# Patient Record
Sex: FEMALE | Race: WHITE | NOT HISPANIC OR LATINO
[De-identification: names, ages, dates, MRNs, and addresses within clinical notes are randomized per-mention and may not be internally consistent; named-entity substitution may affect disease eponyms.]

---

## 2020-08-31 ENCOUNTER — APPOINTMENT (OUTPATIENT)
Dept: TRANSGENDER CARE | Facility: CLINIC | Age: 25
End: 2020-08-31

## 2020-09-02 ENCOUNTER — APPOINTMENT (OUTPATIENT)
Dept: TRANSGENDER CARE | Facility: CLINIC | Age: 25
End: 2020-09-02
Payer: COMMERCIAL

## 2020-09-02 VITALS
DIASTOLIC BLOOD PRESSURE: 86 MMHG | OXYGEN SATURATION: 98 % | RESPIRATION RATE: 14 BRPM | TEMPERATURE: 98 F | SYSTOLIC BLOOD PRESSURE: 130 MMHG | HEART RATE: 82 BPM | BODY MASS INDEX: 19.4 KG/M2 | HEIGHT: 68 IN | WEIGHT: 128 LBS

## 2020-09-02 DIAGNOSIS — Z12.12 ENCOUNTER FOR SCREENING FOR MALIGNANT NEOPLASM OF RECTUM: ICD-10-CM

## 2020-09-02 DIAGNOSIS — Z13.1 ENCOUNTER FOR SCREENING FOR DIABETES MELLITUS: ICD-10-CM

## 2020-09-02 DIAGNOSIS — Z82.49 FAMILY HISTORY OF ISCHEMIC HEART DISEASE AND OTHER DISEASES OF THE CIRCULATORY SYSTEM: ICD-10-CM

## 2020-09-02 DIAGNOSIS — Z87.891 PERSONAL HISTORY OF NICOTINE DEPENDENCE: ICD-10-CM

## 2020-09-02 DIAGNOSIS — F32.9 ANXIETY DISORDER, UNSPECIFIED: ICD-10-CM

## 2020-09-02 DIAGNOSIS — Z76.89 PERSONS ENCOUNTERING HEALTH SERVICES IN OTHER SPECIFIED CIRCUMSTANCES: ICD-10-CM

## 2020-09-02 DIAGNOSIS — Z80.3 FAMILY HISTORY OF MALIGNANT NEOPLASM OF BREAST: ICD-10-CM

## 2020-09-02 DIAGNOSIS — F41.9 ANXIETY DISORDER, UNSPECIFIED: ICD-10-CM

## 2020-09-02 DIAGNOSIS — Z13.220 ENCOUNTER FOR SCREENING FOR LIPOID DISORDERS: ICD-10-CM

## 2020-09-02 PROCEDURE — 36415 COLL VENOUS BLD VENIPUNCTURE: CPT

## 2020-09-02 PROCEDURE — 99205 OFFICE O/P NEW HI 60 MIN: CPT | Mod: 25

## 2020-09-02 NOTE — PHYSICAL EXAM
[No Acute Distress] : no acute distress [Well Nourished] : well nourished [Well-Appearing] : well-appearing [Well Developed] : well developed [Normal Sclera/Conjunctiva] : normal sclera/conjunctiva [EOMI] : extraocular movements intact [No JVD] : no jugular venous distention [Normal Oropharynx] : the oropharynx was normal [No Lymphadenopathy] : no lymphadenopathy [Thyroid Normal, No Nodules] : the thyroid was normal and there were no nodules present [Supple] : supple [No Respiratory Distress] : no respiratory distress  [No Accessory Muscle Use] : no accessory muscle use [Normal Rate] : normal rate  [Clear to Auscultation] : lungs were clear to auscultation bilaterally [Regular Rhythm] : with a regular rhythm [Normal S1, S2] : normal S1 and S2 [No Murmur] : no murmur heard [No Varicosities] : no varicosities [No Edema] : there was no peripheral edema [No Extremity Clubbing/Cyanosis] : no extremity clubbing/cyanosis [Soft] : abdomen soft [Non Tender] : non-tender [Non-distended] : non-distended [Normal Bowel Sounds] : normal bowel sounds [Normal Posterior Cervical Nodes] : no posterior cervical lymphadenopathy [Normal Anterior Cervical Nodes] : no anterior cervical lymphadenopathy [No Joint Swelling] : no joint swelling [Grossly Normal Strength/Tone] : grossly normal strength/tone [No Rash] : no rash [No Focal Deficits] : no focal deficits [Coordination Grossly Intact] : coordination grossly intact [Normal Gait] : normal gait [Normal Insight/Judgement] : insight and judgment were intact [Normal Affect] : the affect was normal

## 2020-09-03 NOTE — SOCIAL HISTORY
[Sexually Active] : The patient is sexually active [Frequently] : frequently [Oral-Receptive (pt. mouth)] : oral-receptive (pt. mouth) [Monogamous] : is not monogamous [To Pt Genitalia] : pt genitalia [Anal-Receptive (pt anus)] : anal-receptive (pt anus) [Partnership for Health – Safe Sex Evidence Based Intervention] : The Partnership for Health Safe Sex Evidence Based Intervention was applied [To Pt Penis] : pt penis [Positive Reinforcement of Safe Behavior Message] : and a positive reinforcement of safe behavior message was provided.

## 2020-09-03 NOTE — PLAN
[FreeTextEntry1] : \par Gender-affirming care:\par Patient to continue care with current mental health provider. Will ask provider for a letter of support to start gender-affirming hormone therapy. \par We discussed hormone therapy at length, effects, timeline, risks, benefits, side effects. \par Patient would like to start in next month or two, so will check baseline endocrine labs and rule out underlying endocrinopathy and assess fitness for initiation of hormone therapy soon. \par Patient is exploring whether they would want to sperm bank or not, but discussed options.\par No desire for name/gender change at present. Not a priority.\par No desire for surgical interventions at present. \par \par HCM:\par Will screen for STIs today. \par Encouraged to maintain balanced diet and to exercise regularly.\par Regular dental and eye exams advised.

## 2020-09-03 NOTE — HISTORY OF PRESENT ILLNESS
[de-identified] : • Name: Murphy \par • AGAB: Male \par • Gender Identity: “Exploring “ \par • Pronouns: They/Them \par • Sexual Orientation: Bisexual \par \par • Reason for Appointment: 25 year old assigned Male at Birth, ID “Exploring “They/Them pronouns interested in hormone affirming therapy and primary care.\par \par COVID Screening: Has not been tested for COVID or antibodies. Denies any COVID related symptoms. Overall feels well. \par \par • Worries: Their parents are . Mother recently sold the house and they will be moving to [facility] in October. Unsure of exact location, as mom is still looking for houses. Is nervous about the cost of medically transitioning. They are unemployed and soon will have to get their own health insurance. \par \par  • Transition HX + Present Life: Their parents  four years ago. At the time, they chose to live with their mother because their place of employment was close by. States as a child they had feminine attractions. Identified as gender fluid most of their life. Interested in medically transitioning to female. Has not come out to their mother. Describes a “tense “relationship but overall she is supportive. Believes their father will be accepting, as they identify as schwab. \par \par • Education | Employment: Graduated high school. Not interested in continuing their education at this time. Unemployed. Former job was as an  in an office. Their daily routine consists of being home and reading. They prefer to keep to themselves. \par \par • Mental Health + Medications: In care with a therapist, Brittany DOMINGO Virtual sessions are weekly. Previously met with a psychiatrist but reports bad experience and never returned.Diagnosed with depression and anxiety. Had Trintellix, Lexapro, Wellbutrin, and Gabapentin, none were helpful. Now on no medications. Not interested in psychiatry care. May consider connecting with support groups in the future, not now. No psych admissions to the hospital. Reports reoccurring SI (thoughts), last happened about one month ago.  \par \par • Endocrinology: interested in hormones. No hx of puberty blockers/ HRT. \par \par • Primary Care: Generally goes to a walk in center if needed. Last physical exam was over a year ago. Wants to establish primary care services at this time. \par \par • Coping Approaches: Practices include reading, playing the guitar and socializing with friends. \par \par • Feelings of Gender Dysphoria/ Body Dysmorphia: Dysphoric about overall appearance. Voiced, significant gender dysphoria relating to facial and body hair. \par \par • Appearance |Tucking | Hair Removal: Presents as male. Starting to lean more towards a gender neutral appearance. Denies ever tucking. Shaves facial and body hair frequently. \par \par • Tattoos/ Piercing: Denies any body/facial piercings. Has half sleeves. All done with single use/sterile needles. May want to complete full sleeve in the future. \par \par • Cigarette, Vaping, Marijuana, Alcohol, and Drug Use: Past cigarette use, 4 years ago. Denies any drug, vaping use. Marijuana use, daily. Denies any alcohol use. \par \par • Reproductive Endocrinology: Not interested.\par  \par • Gender Affirming Surgery: Not a priority right now. \par \par • Name Change + Gender Marker: Not a priority right now. \par \par • General Health: Physical health is good. Mental health not so good. Frequent anxiety symptoms. No recent illness. No known COVID exposure. Sleeping ok. Diet is "not great". Inconsistent. Sometimes does not eat enough. Eats at least one meal/day. No real physical activities currently. \par \par • Sexual Health: Identifies as bisexual. 2 partners in the past one year. <10 partners in lifetime. Oral, receptive anal, insertive anal and vaginal sex. Condom use with most encounters. No HIV postivie partners to his knowledge. Last HIV/ STI exam, June 2019. Never been on PreP. \par \par HCM:\par Last dental exam about 2 years ago.\par Last eye exam within last two years.\par

## 2020-09-03 NOTE — REVIEW OF SYSTEMS
[Joint Pain] : joint pain [Anxiety] : anxiety [Depression] : depression [Fever] : no fever [Chills] : no chills [Fatigue] : no fatigue [Discharge] : no discharge [Redness] : no redness [Vision Problems] : no vision problems [Itching] : no itching [Earache] : no earache [Chest Pain] : no chest pain [Nasal Discharge] : no nasal discharge [Sore Throat] : no sore throat [Shortness Of Breath] : no shortness of breath [Lower Ext Edema] : no lower extremity edema [Palpitations] : no palpitations [Cough] : no cough [Wheezing] : no wheezing [Dyspnea on Exertion] : not dyspnea on exertion [Nausea] : no nausea [Constipation] : no constipation [Diarrhea] : no diarrhea [Heartburn] : no heartburn [Vomiting] : no vomiting [Dysuria] : no dysuria [Melena] : no melena [Frequency] : no frequency [Hematuria] : no hematuria [Muscle Pain] : no muscle pain [Back Pain] : no back pain [Joint Stiffness] : no joint stiffness [Mole Changes] : no mole changes [Skin Rash] : no skin rash [Headache] : no headache [Dizziness] : no dizziness [Fainting] : no fainting [Suicidal] : not suicidal [Insomnia] : no insomnia [Easy Bleeding] : no easy bleeding [Easy Bruising] : no easy bruising [Swollen Glands] : no swollen glands

## 2020-09-16 LAB
25(OH)D3 SERPL-MCNC: 15.3 NG/ML
ALBUMIN SERPL ELPH-MCNC: 5.6 G/DL
ALP BLD-CCNC: 62 U/L
ALT SERPL-CCNC: 24 U/L
ANAL PAP CYTOLOGY: NORMAL
ANION GAP SERPL CALC-SCNC: 17 MMOL/L
APPEARANCE: CLEAR
AST SERPL-CCNC: 22 U/L
BASOPHILS # BLD AUTO: 0.06 K/UL
BASOPHILS NFR BLD AUTO: 0.6 %
BILIRUB SERPL-MCNC: 0.7 MG/DL
BILIRUBIN URINE: NEGATIVE
BLOOD URINE: NEGATIVE
BUN SERPL-MCNC: 16 MG/DL
C TRACH RRNA SPEC QL NAA+PROBE: NOT DETECTED
CALCIUM SERPL-MCNC: 10.5 MG/DL
CHLORIDE SERPL-SCNC: 100 MMOL/L
CHOLEST SERPL-MCNC: 220 MG/DL
CHOLEST/HDLC SERPL: 2.4 RATIO
CO2 SERPL-SCNC: 25 MMOL/L
COLOR: COLORLESS
CREAT SERPL-MCNC: 0.89 MG/DL
EOSINOPHIL # BLD AUTO: 0.04 K/UL
EOSINOPHIL NFR BLD AUTO: 0.4 %
ESTIMATED AVERAGE GLUCOSE: 97 MG/DL
GLUCOSE QUALITATIVE U: NEGATIVE
GLUCOSE SERPL-MCNC: 88 MG/DL
HBA1C MFR BLD HPLC: 5 %
HBV CORE IGG+IGM SER QL: NONREACTIVE
HBV SURFACE AB SERPL IA-ACNC: 92.2 MIU/ML
HBV SURFACE AG SER QL: NONREACTIVE
HCT VFR BLD CALC: 51.4 %
HCV AB SER QL: NONREACTIVE
HCV S/CO RATIO: 0.18 S/CO
HDLC SERPL-MCNC: 91 MG/DL
HGB BLD-MCNC: 17.4 G/DL
HIV1+2 AB SPEC QL IA.RAPID: NONREACTIVE
IMM GRANULOCYTES NFR BLD AUTO: 0.2 %
KETONES URINE: NEGATIVE
LDLC SERPL CALC-MCNC: 113 MG/DL
LEUKOCYTE ESTERASE URINE: NEGATIVE
LYMPHOCYTES # BLD AUTO: 1.54 K/UL
LYMPHOCYTES NFR BLD AUTO: 15.5 %
MAN DIFF?: NORMAL
MCHC RBC-ENTMCNC: 30.3 PG
MCHC RBC-ENTMCNC: 33.9 GM/DL
MCV RBC AUTO: 89.4 FL
MONOCYTES # BLD AUTO: 0.53 K/UL
MONOCYTES NFR BLD AUTO: 5.3 %
N GONORRHOEA RRNA SPEC QL NAA+PROBE: NOT DETECTED
NEUTROPHILS # BLD AUTO: 7.77 K/UL
NEUTROPHILS NFR BLD AUTO: 78 %
NITRITE URINE: NEGATIVE
PH URINE: 6
PLATELET # BLD AUTO: 269 K/UL
POTASSIUM SERPL-SCNC: 4.8 MMOL/L
PROT SERPL-MCNC: 8.5 G/DL
PROTEIN URINE: NEGATIVE
RBC # BLD: 5.75 M/UL
RBC # FLD: 12.3 %
SODIUM SERPL-SCNC: 142 MMOL/L
SOURCE AMPLIFICATION: NORMAL
SOURCE ANAL: NORMAL
SOURCE ORAL: NORMAL
SPECIFIC GRAVITY URINE: 1.01
T PALLIDUM AB SER QL IA: NEGATIVE
TRIGL SERPL-MCNC: 84 MG/DL
TSH SERPL-ACNC: 2.24 UIU/ML
UROBILINOGEN URINE: NORMAL
WBC # FLD AUTO: 9.96 K/UL

## 2020-10-14 ENCOUNTER — APPOINTMENT (OUTPATIENT)
Dept: TRANSGENDER CARE | Facility: CLINIC | Age: 25
End: 2020-10-14
Payer: COMMERCIAL

## 2020-10-14 VITALS
HEART RATE: 72 BPM | TEMPERATURE: 98.5 F | BODY MASS INDEX: 18.94 KG/M2 | RESPIRATION RATE: 16 BRPM | WEIGHT: 125 LBS | DIASTOLIC BLOOD PRESSURE: 68 MMHG | HEIGHT: 68 IN | SYSTOLIC BLOOD PRESSURE: 130 MMHG

## 2020-10-14 PROCEDURE — 99213 OFFICE O/P EST LOW 20 MIN: CPT | Mod: 25

## 2020-10-14 PROCEDURE — 36415 COLL VENOUS BLD VENIPUNCTURE: CPT

## 2020-10-17 NOTE — PLAN
[FreeTextEntry1] : \par Gender:\par Check hormone levels today in anticipation of starting on hormone therapy. \par Patient does not desire full feminization, prefers an androgynous appearance. \par Would likely only want to take one agent. \par Patient needs mental health clearance. Will send once obtained, and can then be scheduled for a follow up for consent and to start on therapy. \par \par Low vitamin D: \par Patient advised to start vitamin D supplementation.

## 2020-10-17 NOTE — REVIEW OF SYSTEMS
[Fever] : no fever [Chills] : no chills [Fatigue] : no fatigue [Discharge] : no discharge [Vision Problems] : no vision problems [Earache] : no earache [Nasal Discharge] : no nasal discharge [Sore Throat] : no sore throat [Chest Pain] : no chest pain [Lower Ext Edema] : no lower extremity edema [Shortness Of Breath] : no shortness of breath [Palpitations] : no palpitations [Cough] : no cough [Wheezing] : no wheezing [Dyspnea on Exertion] : not dyspnea on exertion [Abdominal Pain] : no abdominal pain [Nausea] : no nausea [Constipation] : no constipation [Diarrhea] : no diarrhea [Heartburn] : no heartburn [Vomiting] : no vomiting [Melena] : no melena [Dysuria] : no dysuria [Frequency] : no frequency [Joint Pain] : no joint pain [Itching] : no itching [Back Pain] : no back pain [Dizziness] : no dizziness [Headache] : no headache [Mole Changes] : no mole changes [Suicidal] : not suicidal [FreeTextEntry9] : left hand pain below wrist 3rd and 4th digits

## 2020-10-17 NOTE — HISTORY OF PRESENT ILLNESS
[FreeTextEntry1] : follow up gender-affirming care [de-identified] : 25 year old gender nonconforming individual here today to discuss start of hormone therapy further. They have been thinking about things more. Still don't think that they want to preserve sperm. Has not yet gotten a letter of clearance from their mental health provider, but reports that provider is willing to generate one. \par \par Patient also complaining of left hand pain. Happens about once per week. Usually short, but last night was for a long period (about 2.5 hours). No numbness or tingling. Hand felt weak as well. No cervical spine pain.

## 2020-10-17 NOTE — PHYSICAL EXAM
[No Acute Distress] : no acute distress [Well Nourished] : well nourished [Well Developed] : well developed [Well-Appearing] : well-appearing [Normal Sclera/Conjunctiva] : normal sclera/conjunctiva [EOMI] : extraocular movements intact [Normal Oropharynx] : the oropharynx was normal [Normal Outer Ear/Nose] : the outer ears and nose were normal in appearance [Supple] : supple [No Lymphadenopathy] : no lymphadenopathy [No Accessory Muscle Use] : no accessory muscle use [No Respiratory Distress] : no respiratory distress  [Normal Rate] : normal rate  [Clear to Auscultation] : lungs were clear to auscultation bilaterally [Regular Rhythm] : with a regular rhythm [Normal S1, S2] : normal S1 and S2 [No Varicosities] : no varicosities [No Edema] : there was no peripheral edema [No Murmur] : no murmur heard [No Extremity Clubbing/Cyanosis] : no extremity clubbing/cyanosis [Non Tender] : non-tender [Soft] : abdomen soft [Normal Posterior Cervical Nodes] : no posterior cervical lymphadenopathy [Normal Bowel Sounds] : normal bowel sounds [Non-distended] : non-distended [Normal Anterior Cervical Nodes] : no anterior cervical lymphadenopathy [No Joint Swelling] : no joint swelling [No Rash] : no rash [Grossly Normal Strength/Tone] : grossly normal strength/tone [Coordination Grossly Intact] : coordination grossly intact [Normal Gait] : normal gait [No Focal Deficits] : no focal deficits [Normal Insight/Judgement] : insight and judgment were intact [Normal Affect] : the affect was normal

## 2020-10-21 DIAGNOSIS — Z86.2 PERSONAL HISTORY OF DISEASES OF THE BLOOD AND BLOOD-FORMING ORGANS AND CERTAIN DISORDERS INVOLVING THE IMMUNE MECHANISM: ICD-10-CM

## 2020-10-21 LAB
BASOPHILS # BLD AUTO: 0.03 K/UL
BASOPHILS NFR BLD AUTO: 0.6 %
EOSINOPHIL # BLD AUTO: 0.08 K/UL
EOSINOPHIL NFR BLD AUTO: 1.7 %
HCT VFR BLD CALC: 44.2 %
HGB BLD-MCNC: 14.8 G/DL
IMM GRANULOCYTES NFR BLD AUTO: 0.2 %
LYMPHOCYTES # BLD AUTO: 1.83 K/UL
LYMPHOCYTES NFR BLD AUTO: 39.5 %
MAN DIFF?: NORMAL
MCHC RBC-ENTMCNC: 29.3 PG
MCHC RBC-ENTMCNC: 33.5 GM/DL
MCV RBC AUTO: 87.5 FL
MONOCYTES # BLD AUTO: 0.41 K/UL
MONOCYTES NFR BLD AUTO: 8.9 %
NEUTROPHILS # BLD AUTO: 2.27 K/UL
NEUTROPHILS NFR BLD AUTO: 49.1 %
PLATELET # BLD AUTO: 238 K/UL
RBC # BLD: 5.05 M/UL
RBC # FLD: 11.9 %
WBC # FLD AUTO: 4.63 K/UL

## 2020-10-26 LAB
ALBUMIN SERPL ELPH-MCNC: 4.8 G/DL
ALP BLD-CCNC: 57 U/L
ALT SERPL-CCNC: 18 U/L
ANION GAP SERPL CALC-SCNC: 10 MMOL/L
AST SERPL-CCNC: 22 U/L
BILIRUB SERPL-MCNC: 1 MG/DL
BUN SERPL-MCNC: 17 MG/DL
CALCIUM SERPL-MCNC: 9.7 MG/DL
CHLORIDE SERPL-SCNC: 104 MMOL/L
CO2 SERPL-SCNC: 27 MMOL/L
CREAT SERPL-MCNC: 0.99 MG/DL
ESTRADIOL SERPL-MCNC: 26 PG/ML
FSH SERPL-MCNC: 3.7 IU/L
GLUCOSE SERPL-MCNC: 85 MG/DL
LH SERPL-ACNC: 5.1 IU/L
POTASSIUM SERPL-SCNC: 4.8 MMOL/L
PROLACTIN SERPL-MCNC: 12.7 NG/ML
PROT SERPL-MCNC: 7.4 G/DL
SHBG SERPL-SCNC: 42 NMOL/L
SODIUM SERPL-SCNC: 141 MMOL/L
TESTOST SERPL-MCNC: 554 NG/DL

## 2020-10-29 ENCOUNTER — TRANSCRIPTION ENCOUNTER (OUTPATIENT)
Age: 25
End: 2020-10-29

## 2020-11-04 LAB
MONOMERIC PROLACTIN (ICMA)*: 13 NG/ML
PERCENT MACROPROLACTIN: 19 %
PROLACTIN, SERUM (ICMA)*: 16 NG/ML

## 2020-11-23 ENCOUNTER — NON-APPOINTMENT (OUTPATIENT)
Age: 25
End: 2020-11-23

## 2020-12-02 ENCOUNTER — APPOINTMENT (OUTPATIENT)
Dept: TRANSGENDER CARE | Facility: CLINIC | Age: 25
End: 2020-12-02
Payer: COMMERCIAL

## 2020-12-02 PROCEDURE — 99214 OFFICE O/P EST MOD 30 MIN: CPT | Mod: 95

## 2020-12-02 NOTE — ASSESSMENT
[FreeTextEntry1] : Patient wishes to start gender-affirming Feminization therapy.  \par \par Discussed with patient that the feminizing changes of Estrogen and Spironolactone may take several months to become noticeable. We will be starting with low doses per patient preference, so changes may be very subtle or non-existent. \par \par Discussed permanent changes which include breast growth, testicular atrophy, and infertility. \par \par Discussed non-permanent changes including loss of muscle mass and strength, weight gain w/ redistribution of fat to buttocks and thighs, facial/body hair softening and lightening (current facial and body hair will not go away), male pattern baldness may slow or stop, but not go away, reduced sex drive, decreased strength of erections or cessation of erections, decrease in volume and viscosity of ejaculate, changes in mood including; increased emotional responses.  \par \par Discussed risks and possible side effects, including loss of fertility, increased urinary frequency, possible increased risk of kidney damage, increase risk of blood clots, strokes, and heart attacks, possible increase in cholesterol and blood pressure leading to increased risk for cardiovascular disease, possible increase in risk of developing diabetes, increased appetite and weight gain, possible liver damage, possible worsening of headaches or migraines, galactorrhea and increased prolactin levels, increased risk of prolactinomas, possible worsening of depression or mood swings, and possible increased risk of breast cancer. \par \par Patient understands that smoking cigarettes may increase some of the risks of taking estrogen, and that taking doses higher than recommended will increase the risks. Patient understands that feminizing hormone therapy is expected to be lifelong and that suddenly stopping it after a long time may have negative health effects. Patient understands they may choose to stop therapy at any time and for any reason, but they are encouraged to discuss this decision with a healthcare provider prior to doing this. Patient understands that some medical reasons and/or safety concerns may require decrease in dosage or cessation of therapy at providers discretion.  \par \par Cryopreservation/fertility options were discussed with patient. Not interested. \par \par Patient voices understanding of our discussion today. Questions and concerns have been addressed. Patient consents to initiation of treatment.  \par \par \par

## 2020-12-02 NOTE — HISTORY OF PRESENT ILLNESS
[Other Location: e.g. School (Enter Location, City,State)___] : at [unfilled], at the time of the visit. [Medical Office: ([facility])___] : at the medical office located in  [Verbal consent obtained from patient] : the patient, [unfilled] [FreeTextEntry1] : Starting feminizing hormone therapy [de-identified] : Visit today to discuss initiation of feminizing hormone therapy. They report over the past few days they have had some nausea, heartburn, night sweats, but feeling better today. Feeling better today, so they have started to eat again. Patient still with some heartburn. Would like to try some medication for this. \par \par Patient was to cleared to start on hormone therapy by psychologist Tita Zepeda.\par

## 2020-12-02 NOTE — REVIEW OF SYSTEMS
[Fatigue] : fatigue [Nausea] : nausea [Diarrhea] : diarrhea [Heartburn] : heartburn [Insomnia] : insomnia [Anxiety] : anxiety [Fever] : no fever [Chills] : no chills [Discharge] : no discharge [Pain] : no pain [Earache] : no earache [Nasal Discharge] : no nasal discharge [Sore Throat] : no sore throat [Chest Pain] : no chest pain [Palpitations] : no palpitations [Lower Ext Edema] : no lower extremity edema [Shortness Of Breath] : no shortness of breath [Wheezing] : no wheezing [Cough] : no cough [Vomiting] : no vomiting [Dysuria] : no dysuria [Hematuria] : no hematuria [Joint Pain] : no joint pain [Back Pain] : no back pain [Suicidal] : not suicidal

## 2020-12-02 NOTE — PLAN
[FreeTextEntry1] : Baseline labs reviewed.  \par \par Start Estradiol 1 mg daily and Spironolactone 25mg daily as patient does not want maximal feminizing effect. Will monitor for response.   \par \par Repeat levels in 3 months, and titrate based on effects. \par \par

## 2020-12-02 NOTE — PHYSICAL EXAM
[No Acute Distress] : no acute distress [Well Nourished] : well nourished [Well Developed] : well developed [Well-Appearing] : well-appearing [EOMI] : extraocular movements intact [No Respiratory Distress] : no respiratory distress  [No Accessory Muscle Use] : no accessory muscle use [No Focal Deficits] : no focal deficits [Normal Affect] : the affect was normal [Normal Insight/Judgement] : insight and judgment were intact

## 2020-12-03 ENCOUNTER — NON-APPOINTMENT (OUTPATIENT)
Age: 25
End: 2020-12-03

## 2021-02-16 ENCOUNTER — NON-APPOINTMENT (OUTPATIENT)
Age: 26
End: 2021-02-16

## 2021-02-21 ENCOUNTER — RX RENEWAL (OUTPATIENT)
Age: 26
End: 2021-02-21

## 2021-02-22 ENCOUNTER — APPOINTMENT (OUTPATIENT)
Dept: TRANSGENDER CARE | Facility: CLINIC | Age: 26
End: 2021-02-22
Payer: COMMERCIAL

## 2021-02-22 VITALS
DIASTOLIC BLOOD PRESSURE: 70 MMHG | BODY MASS INDEX: 18.04 KG/M2 | HEART RATE: 77 BPM | SYSTOLIC BLOOD PRESSURE: 120 MMHG | WEIGHT: 119 LBS | HEIGHT: 68 IN | TEMPERATURE: 98.1 F | RESPIRATION RATE: 16 BRPM | OXYGEN SATURATION: 99 %

## 2021-02-22 PROCEDURE — 99213 OFFICE O/P EST LOW 20 MIN: CPT

## 2021-02-22 PROCEDURE — 99072 ADDL SUPL MATRL&STAF TM PHE: CPT

## 2021-02-22 NOTE — PHYSICAL EXAM
[No Acute Distress] : no acute distress [Well Nourished] : well nourished [Well Developed] : well developed [Well-Appearing] : well-appearing [Normal Sclera/Conjunctiva] : normal sclera/conjunctiva [EOMI] : extraocular movements intact [No Lymphadenopathy] : no lymphadenopathy [Supple] : supple [No Respiratory Distress] : no respiratory distress  [No Accessory Muscle Use] : no accessory muscle use [Clear to Auscultation] : lungs were clear to auscultation bilaterally [Normal Rate] : normal rate  [Regular Rhythm] : with a regular rhythm [Normal S1, S2] : normal S1 and S2 [Normal Affect] : the affect was normal [Normal Insight/Judgement] : insight and judgment were intact [de-identified] : +small bite marks on lateral borders of tongue, no petechiae and no plaques noted

## 2021-02-22 NOTE — PLAN
[FreeTextEntry1] : \par Tongue pain:\par Do not believe that patient's tongue discomfort is due to an allergic reaction to Estradiol/Spironolactone. More likely part of a viral syndrome he describes having when symptoms first started. Will give Magic Mouthwash for now and advised patient ok to restart hormone regimen. If symptoms recur, will need to work up further with possible referral for allergy testing. Discussed warning signs for an allergic reaction/angioedema including tongue/lip swelling, difficulty breathing/stridor, chest tightness, all of which will require urgent medical attention.

## 2021-02-22 NOTE — REVIEW OF SYSTEMS
[Fever] : no fever [Chills] : no chills [Fatigue] : no fatigue [Pain] : no pain [Discharge] : no discharge [Vision Problems] : no vision problems [Earache] : no earache [Nasal Discharge] : no nasal discharge [Sore Throat] : no sore throat [Chest Pain] : no chest pain [Palpitations] : no palpitations [Shortness Of Breath] : no shortness of breath [Wheezing] : no wheezing [Cough] : no cough [Abdominal Pain] : no abdominal pain [Nausea] : no nausea [Vomiting] : no vomiting [FreeTextEntry4] : +tongue soreness

## 2021-02-22 NOTE — HISTORY OF PRESENT ILLNESS
[FreeTextEntry1] : oral lesions, ?adverse reaction [de-identified] : Patient here today complaining of a few weeks of painful white plaques on their tongue as well as some tongue inflammation. They report this started about 2 months after starting on hormone therapy, so they stopped it about 1 week ago. Had a cold back a couple of months ago near when this started. No fevers or chills recently. No recent sexual activity. None since before their last testing in September.\par

## 2021-02-25 ENCOUNTER — NON-APPOINTMENT (OUTPATIENT)
Age: 26
End: 2021-02-25

## 2021-03-04 ENCOUNTER — APPOINTMENT (OUTPATIENT)
Dept: TRANSGENDER CARE | Facility: CLINIC | Age: 26
End: 2021-03-04

## 2021-03-18 ENCOUNTER — APPOINTMENT (OUTPATIENT)
Dept: TRANSGENDER CARE | Facility: CLINIC | Age: 26
End: 2021-03-18
Payer: COMMERCIAL

## 2021-03-18 VITALS
RESPIRATION RATE: 16 BRPM | OXYGEN SATURATION: 98 % | BODY MASS INDEX: 19.1 KG/M2 | WEIGHT: 126 LBS | TEMPERATURE: 98.4 F | DIASTOLIC BLOOD PRESSURE: 70 MMHG | SYSTOLIC BLOOD PRESSURE: 110 MMHG | HEIGHT: 68 IN | HEART RATE: 84 BPM

## 2021-03-18 DIAGNOSIS — E55.9 VITAMIN D DEFICIENCY, UNSPECIFIED: ICD-10-CM

## 2021-03-18 DIAGNOSIS — K14.6 GLOSSODYNIA: ICD-10-CM

## 2021-03-18 LAB
ALBUMIN SERPL ELPH-MCNC: 4.8 G/DL
ALP BLD-CCNC: 66 U/L
ALT SERPL-CCNC: 16 U/L
ANION GAP SERPL CALC-SCNC: 9 MMOL/L
AST SERPL-CCNC: 20 U/L
BILIRUB SERPL-MCNC: 0.4 MG/DL
BUN SERPL-MCNC: 14 MG/DL
CALCIUM SERPL-MCNC: 9.8 MG/DL
CHLORIDE SERPL-SCNC: 103 MMOL/L
CO2 SERPL-SCNC: 30 MMOL/L
CREAT SERPL-MCNC: 0.91 MG/DL
ESTRADIOL SERPL-MCNC: 46 PG/ML
GLUCOSE SERPL-MCNC: 86 MG/DL
POTASSIUM SERPL-SCNC: 5.2 MMOL/L
PROT SERPL-MCNC: 7.2 G/DL
SODIUM SERPL-SCNC: 142 MMOL/L
TESTOST SERPL-MCNC: 524 NG/DL
TSH SERPL-ACNC: 2.07 UIU/ML

## 2021-03-18 PROCEDURE — 99072 ADDL SUPL MATRL&STAF TM PHE: CPT

## 2021-03-18 PROCEDURE — 99214 OFFICE O/P EST MOD 30 MIN: CPT

## 2021-03-18 NOTE — PHYSICAL EXAM
[No Acute Distress] : no acute distress [Well Nourished] : well nourished [Well Developed] : well developed [Well-Appearing] : well-appearing [Normal Sclera/Conjunctiva] : normal sclera/conjunctiva [EOMI] : extraocular movements intact [Normal Outer Ear/Nose] : the outer ears and nose were normal in appearance [No Lymphadenopathy] : no lymphadenopathy [Supple] : supple [No Respiratory Distress] : no respiratory distress  [No Accessory Muscle Use] : no accessory muscle use [Clear to Auscultation] : lungs were clear to auscultation bilaterally [Normal Rate] : normal rate  [Regular Rhythm] : with a regular rhythm [Normal S1, S2] : normal S1 and S2 [No Edema] : there was no peripheral edema [No Extremity Clubbing/Cyanosis] : no extremity clubbing/cyanosis [Soft] : abdomen soft [Non Tender] : non-tender [Non-distended] : non-distended [Normal Bowel Sounds] : normal bowel sounds [No Joint Swelling] : no joint swelling [Grossly Normal Strength/Tone] : grossly normal strength/tone [Coordination Grossly Intact] : coordination grossly intact [No Focal Deficits] : no focal deficits [Normal Gait] : normal gait [Normal Affect] : the affect was normal [Normal Insight/Judgement] : insight and judgment were intact [de-identified] : +small linear bite marks on lateral borders of tongue

## 2021-03-18 NOTE — PLAN
[FreeTextEntry1] : \par GAHT:\par Increase Estradiol to 1mg BID and Spironolactone 50mg BID.\par Repeat labs in 3 months.\par \par Vitamin D deficiency: \par Repeat level today.\par \par Tongue discomfort:\par Improved with magic mouthwash.\par Will try OTC Mouth Sore Mouthwash (with peroxide) daily for now. and monitor symptoms. \par

## 2021-03-18 NOTE — REVIEW OF SYSTEMS
[Fever] : no fever [Chills] : no chills [Fatigue] : no fatigue [Discharge] : no discharge [Vision Problems] : no vision problems [Earache] : no earache [Nasal Discharge] : no nasal discharge [Sore Throat] : no sore throat [Chest Pain] : no chest pain [Palpitations] : no palpitations [Lower Ext Edema] : no lower extremity edema [Shortness Of Breath] : no shortness of breath [Wheezing] : no wheezing [Cough] : no cough [Abdominal Pain] : no abdominal pain [Nausea] : no nausea [Constipation] : no constipation [Diarrhea] : no diarrhea [Vomiting] : no vomiting [Dysuria] : no dysuria [Hematuria] : no hematuria [Joint Pain] : no joint pain [Back Pain] : no back pain [Headache] : no headache [Dizziness] : no dizziness [FreeTextEntry4] : +tongue discomfort

## 2021-03-18 NOTE — HISTORY OF PRESENT ILLNESS
[FreeTextEntry1] : f/u hormone therapy [de-identified] : Patient has been feeling ok. They have been back on Estradiol 1 mg daily and Spironolactone 25mg daily. Labs ordered form 3/8 (drawn on 3/12 per patient) show Estradiol at 46 and Testosterone at 524. Mouth sores have improved with magic mouthwash. Still mild discomfort. No recent illness.

## 2021-03-22 ENCOUNTER — TRANSCRIPTION ENCOUNTER (OUTPATIENT)
Age: 26
End: 2021-03-22

## 2021-03-22 LAB — 25(OH)D3 SERPL-MCNC: 35.2 NG/ML

## 2021-04-19 ENCOUNTER — APPOINTMENT (OUTPATIENT)
Dept: TRANSGENDER CARE | Facility: CLINIC | Age: 26
End: 2021-04-19
Payer: COMMERCIAL

## 2021-04-19 VITALS
BODY MASS INDEX: 19.1 KG/M2 | RESPIRATION RATE: 16 BRPM | HEART RATE: 74 BPM | DIASTOLIC BLOOD PRESSURE: 72 MMHG | WEIGHT: 126 LBS | SYSTOLIC BLOOD PRESSURE: 118 MMHG | HEIGHT: 68 IN | TEMPERATURE: 98.3 F | OXYGEN SATURATION: 99 %

## 2021-04-19 DIAGNOSIS — R11.0 NAUSEA: ICD-10-CM

## 2021-04-19 PROCEDURE — 99213 OFFICE O/P EST LOW 20 MIN: CPT

## 2021-04-19 PROCEDURE — 99072 ADDL SUPL MATRL&STAF TM PHE: CPT

## 2021-04-19 NOTE — HISTORY OF PRESENT ILLNESS
[FreeTextEntry8] : JOSSELIN PANTOJA is a 25 year old, seen on 04/19/2021 for  LLQ pain accompanied by nausea x1 week. \par  As per patient, he is currently in the process of been moving homes and is unsure when exactly this pain began but noticed the pain while moving.\par Patient is unsure of what cause the pain and reports that it was consistently a 6/10 at the time (1 week ago) and last for 5 days but has since "receded" and comes and goes.  Currently states 2/10 pain.\par Describes pain as dull, heaviness with moments of intense sharp pain to lower left quadrant of abdomen, without radiation.\par Denies fevers, headaches, back pain.\par Denies diarrhea, vomiting, loss of appetite, or constipation. \par Patient states that he is unsure if it is related to hernia but thought it could be a possibility though he "didn't’ feel anything when he pressed in area."\par Denies any OTC remedies or doing anything specific other than resting to alleviate the pain.\par Currently eating and drinking with out issue.\par At present patient denies any other complaints.  \par \par

## 2021-04-19 NOTE — PHYSICAL EXAM
[No Acute Distress] : no acute distress [Well Nourished] : well nourished [Well Developed] : well developed [Well-Appearing] : well-appearing [Normal Voice/Communication] : normal voice/communication [Normal Sclera/Conjunctiva] : normal sclera/conjunctiva [PERRL] : pupils equal round and reactive to light [Normal Outer Ear/Nose] : the outer ears and nose were normal in appearance [No JVD] : no jugular venous distention [No Lymphadenopathy] : no lymphadenopathy [No Respiratory Distress] : no respiratory distress  [No Accessory Muscle Use] : no accessory muscle use [Clear to Auscultation] : lungs were clear to auscultation bilaterally [Normal Rate] : normal rate  [Regular Rhythm] : with a regular rhythm [Normal S1, S2] : normal S1 and S2 [No Murmur] : no murmur heard [No Abdominal Bruit] : a ~M bruit was not heard ~T in the abdomen [No Edema] : there was no peripheral edema [No Palpable Aorta] : no palpable aorta [Soft] : abdomen soft [Non-distended] : non-distended [No Masses] : no abdominal mass palpated [Normal Bowel Sounds] : normal bowel sounds [Flat] : flat [Normal] : normal to percussion [Suprapubic] : in the suprapubic area [LLQ] : in the left lower quadrant [No Mass] : no masses were palpated [No HSM] : no hepatosplenomegaly noted [None] : no CVA tenderness [No CVA Tenderness] : no CVA  tenderness [No Spinal Tenderness] : no spinal tenderness [Coordination Grossly Intact] : coordination grossly intact [Normal Gait] : normal gait [Deep Tendon Reflexes (DTR)] : deep tendon reflexes were 2+ and symmetric [Speech Grossly Normal] : speech grossly normal [Normal Affect] : the affect was normal [Alert and Oriented x3] : oriented to person, place, and time [Normal Mood] : the mood was normal [Normal Insight/Judgement] : insight and judgment were intact [No Rash] : no rash [No Skin Lesions] : no skin lesions [Epigastric] : not in the epigastric area [Periumbilical] : not periumbilical [RUQ] : not in the RUQ [RLQ] : not in the RLQ [LUQ] : not in the LUQ [Firm] : not firm [Rigid] : not rigid [Rebound] : no rebound [Guarding] : no guarding [Morelos's] : a negative Morelos's sign [Rovsing's] : a negative Rovsing's sign [Obturator] : a negative obturator sign [Psoas] : a negative psoas sign [Liver Tender To Palpation] : not tender [Acne] : no acne

## 2021-04-19 NOTE — PLAN
[FreeTextEntry1] : LLQ pain and nausea\par  - UA/UC sent at this visit\par  - Patient instructed to eat bland foods and increase fluid intake until nausea subsides\par  - Patient instructed to rest and avoid heavy lifting\par  - PRN Zofran ODT provided for relief of nausea\par  - Prescription for US of abdomen and scrotal area provided to r/o any underlying pathology\par  - Instructed to seek out immediate, local care for sudden onset of worsening symptoms\par  - Patient to follow up as needed or sooner if pain persists

## 2021-04-19 NOTE — ASSESSMENT
[FreeTextEntry1] : Patient presenting with c/o LLQ pain x 1.5 weeks.  Pain was initially 6/10 at onset and is currently 1-2/10 on pain scale.  Denies fevers, chills, radiation of pain to back or lower extremities.  Denies changes to diet or changes to bowel habits.  Denies any pain or difficulty with urination. Patient reports associated, persistent nausea but denies vomiting.  Patient states that they have not taken any new medications or tried any OTC medications for relief. Reports only change in daily routine is a recent move from one home to another.\par \par Upon examination, abdomen not tender to palp and (+) bowel sounds in all four quadrants.  No palpable masses noted.  Denies rebound tenderness at present.

## 2021-04-22 LAB
APPEARANCE: CLEAR
APPEARANCE: CLEAR
BACTERIA: NEGATIVE
BILIRUBIN URINE: NEGATIVE
BILIRUBIN URINE: NEGATIVE
BLOOD URINE: NEGATIVE
BLOOD URINE: NEGATIVE
COLOR: NORMAL
COLOR: NORMAL
GLUCOSE QUALITATIVE U: NEGATIVE
GLUCOSE QUALITATIVE U: NEGATIVE
HYALINE CASTS: 0 /LPF
KETONES URINE: NEGATIVE
KETONES URINE: NEGATIVE
LEUKOCYTE ESTERASE URINE: NEGATIVE
LEUKOCYTE ESTERASE URINE: NEGATIVE
MICROSCOPIC-UA: NORMAL
NITRITE URINE: NEGATIVE
NITRITE URINE: NEGATIVE
PH URINE: 6
PH URINE: 6
PROTEIN URINE: NEGATIVE
PROTEIN URINE: NEGATIVE
RED BLOOD CELLS URINE: 2 /HPF
SPECIFIC GRAVITY URINE: 1.02
SPECIFIC GRAVITY URINE: 1.02
SQUAMOUS EPITHELIAL CELLS: 0 /HPF
UROBILINOGEN URINE: NORMAL
UROBILINOGEN URINE: NORMAL
WHITE BLOOD CELLS URINE: 0 /HPF

## 2021-04-27 ENCOUNTER — NON-APPOINTMENT (OUTPATIENT)
Age: 26
End: 2021-04-27

## 2021-04-27 DIAGNOSIS — R10.32 LEFT LOWER QUADRANT PAIN: ICD-10-CM

## 2021-04-27 DIAGNOSIS — R10.9 UNSPECIFIED ABDOMINAL PAIN: ICD-10-CM

## 2021-06-03 ENCOUNTER — APPOINTMENT (OUTPATIENT)
Dept: TRANSGENDER CARE | Facility: CLINIC | Age: 26
End: 2021-06-03
Payer: COMMERCIAL

## 2021-06-03 VITALS
TEMPERATURE: 98.6 F | HEART RATE: 89 BPM | OXYGEN SATURATION: 99 % | WEIGHT: 126 LBS | SYSTOLIC BLOOD PRESSURE: 130 MMHG | DIASTOLIC BLOOD PRESSURE: 84 MMHG | HEIGHT: 68 IN | BODY MASS INDEX: 19.1 KG/M2

## 2021-06-03 DIAGNOSIS — Z13.29 ENCOUNTER FOR SCREENING FOR OTHER SUSPECTED ENDOCRINE DISORDER: ICD-10-CM

## 2021-06-03 PROCEDURE — 99072 ADDL SUPL MATRL&STAF TM PHE: CPT

## 2021-06-03 PROCEDURE — 99213 OFFICE O/P EST LOW 20 MIN: CPT | Mod: 25

## 2021-06-03 PROCEDURE — 36415 COLL VENOUS BLD VENIPUNCTURE: CPT

## 2021-06-03 NOTE — PLAN
[FreeTextEntry1] : HCM:\par Discussed the possible option of an abdominal CAT scan if discomfort to LLQ persists.\par Reiterated the importance of following up with GI for related abdominal discomfort\par All questions and concerns addressed at this time\par \par HRT:\par Lab work done today to assess current levels and fitness for increasing current regimen\par Patient to continue on HRT as prescribed unless otherwise advised by provider\par To follow up in 3 months or sooner as needed.

## 2021-06-03 NOTE — HISTORY OF PRESENT ILLNESS
[FreeTextEntry1] : patient in for labwork  [de-identified] : JOSSELIN PANTOJA is a 25 year old, genderqueer patient seen on 06/03/2021 for  lab work to evaluate HRT regimen and assess levels.\par Current regimen is 1mg tablets of Estradiol BID and has noticed a few changes that they are happy with.  They are hoping to add to facial structure changes and heighten their torso changes (waist, hips, etc).\par \par As per patient, they were last seen and evaluated for LLQ discomfort but all scans showed normal results.  States that the pain comes and goes and was last present starting 1 week ago and comes and goes.  Worst moments of intensity are 6/10 but typically resides at 2/10.  Also reports testicular pain was present over the weekend but is not currently present.  Denies any penile discharge, decreased in urine output or dysuria.  \par Currently states that they feel well overall and at present.\par No signs/symptoms of acute illness. No fever, myalgias, throat pain, meningismus.\par  Patient denies any known exposure or signs/symptoms of COVID-19 at this time. \par Recieved both doses of COVID-19 vaccine (Moderna).\par

## 2021-06-03 NOTE — PHYSICAL EXAM
[No Acute Distress] : no acute distress [Well Nourished] : well nourished [Well Developed] : well developed [Well-Appearing] : well-appearing [Normal Voice/Communication] : normal voice/communication [Normal Outer Ear/Nose] : the outer ears and nose were normal in appearance [No JVD] : no jugular venous distention [No Respiratory Distress] : no respiratory distress  [No Accessory Muscle Use] : no accessory muscle use [Clear to Auscultation] : lungs were clear to auscultation bilaterally [Normal Rate] : normal rate  [Regular Rhythm] : with a regular rhythm [No Edema] : there was no peripheral edema [Soft] : abdomen soft [Non-distended] : non-distended [No HSM] : no HSM [Normal Bowel Sounds] : normal bowel sounds [Coordination Grossly Intact] : coordination grossly intact [Normal Gait] : normal gait [Speech Grossly Normal] : speech grossly normal [Memory Grossly Normal] : memory grossly normal [Normal Affect] : the affect was normal [Alert and Oriented x3] : oriented to person, place, and time [Normal Mood] : the mood was normal [Normal Insight/Judgement] : insight and judgment were intact

## 2021-06-10 LAB
ALBUMIN SERPL ELPH-MCNC: 5 G/DL
ALP BLD-CCNC: 56 U/L
ALT SERPL-CCNC: 15 U/L
ANION GAP SERPL CALC-SCNC: 15 MMOL/L
AST SERPL-CCNC: 21 U/L
BASOPHILS # BLD AUTO: 0.02 K/UL
BASOPHILS NFR BLD AUTO: 0.3 %
BILIRUB SERPL-MCNC: 0.6 MG/DL
BUN SERPL-MCNC: 17 MG/DL
CALCIUM SERPL-MCNC: 9.5 MG/DL
CHLORIDE SERPL-SCNC: 98 MMOL/L
CO2 SERPL-SCNC: 24 MMOL/L
CREAT SERPL-MCNC: 0.85 MG/DL
EOSINOPHIL # BLD AUTO: 0.05 K/UL
EOSINOPHIL NFR BLD AUTO: 0.8 %
ESTRADIOL SERPL-MCNC: 57 PG/ML
GLUCOSE SERPL-MCNC: 63 MG/DL
HCT VFR BLD CALC: 46.2 %
HGB BLD-MCNC: 15.6 G/DL
IMM GRANULOCYTES NFR BLD AUTO: 0.2 %
LYMPHOCYTES # BLD AUTO: 1.67 K/UL
LYMPHOCYTES NFR BLD AUTO: 25.3 %
MAN DIFF?: NORMAL
MCHC RBC-ENTMCNC: 30.1 PG
MCHC RBC-ENTMCNC: 33.8 GM/DL
MCV RBC AUTO: 89 FL
MONOCYTES # BLD AUTO: 0.56 K/UL
MONOCYTES NFR BLD AUTO: 8.5 %
NEUTROPHILS # BLD AUTO: 4.28 K/UL
NEUTROPHILS NFR BLD AUTO: 64.9 %
PLATELET # BLD AUTO: 275 K/UL
POTASSIUM SERPL-SCNC: 4.7 MMOL/L
PROT SERPL-MCNC: 7.8 G/DL
RBC # BLD: 5.19 M/UL
RBC # FLD: 12.2 %
SHBG SERPL-SCNC: 47.5 NMOL/L
SODIUM SERPL-SCNC: 136 MMOL/L
TESTOST SERPL-MCNC: 553 NG/DL
WBC # FLD AUTO: 6.59 K/UL

## 2021-07-23 ENCOUNTER — TRANSCRIPTION ENCOUNTER (OUTPATIENT)
Age: 26
End: 2021-07-23

## 2021-07-23 LAB
ALBUMIN SERPL ELPH-MCNC: 4.8 G/DL
ALP BLD-CCNC: 54 U/L
ALT SERPL-CCNC: 17 U/L
ANION GAP SERPL CALC-SCNC: 10 MMOL/L
AST SERPL-CCNC: 19 U/L
BILIRUB SERPL-MCNC: 0.6 MG/DL
BUN SERPL-MCNC: 19 MG/DL
CALCIUM SERPL-MCNC: 9.8 MG/DL
CHLORIDE SERPL-SCNC: 101 MMOL/L
CO2 SERPL-SCNC: 25 MMOL/L
CREAT SERPL-MCNC: 0.98 MG/DL
ESTRADIOL SERPL-MCNC: 67 PG/ML
GLUCOSE SERPL-MCNC: 98 MG/DL
POTASSIUM SERPL-SCNC: 5.8 MMOL/L
PROT SERPL-MCNC: 7.3 G/DL
SODIUM SERPL-SCNC: 136 MMOL/L
TESTOST SERPL-MCNC: 50.5 NG/DL

## 2021-07-29 ENCOUNTER — TRANSCRIPTION ENCOUNTER (OUTPATIENT)
Age: 26
End: 2021-07-29

## 2021-07-29 LAB
ANION GAP SERPL CALC-SCNC: 11 MMOL/L
BUN SERPL-MCNC: 20 MG/DL
CALCIUM SERPL-MCNC: 10.1 MG/DL
CHLORIDE SERPL-SCNC: 99 MMOL/L
CO2 SERPL-SCNC: 27 MMOL/L
CREAT SERPL-MCNC: 1 MG/DL
GLUCOSE SERPL-MCNC: 72 MG/DL
POTASSIUM SERPL-SCNC: 5.6 MMOL/L
SODIUM SERPL-SCNC: 137 MMOL/L

## 2021-09-02 ENCOUNTER — TRANSCRIPTION ENCOUNTER (OUTPATIENT)
Age: 26
End: 2021-09-02

## 2021-09-02 LAB
ALBUMIN SERPL ELPH-MCNC: 4.8 G/DL
ALP BLD-CCNC: 45 U/L
ALT SERPL-CCNC: 15 U/L
ANION GAP SERPL CALC-SCNC: 14 MMOL/L
AST SERPL-CCNC: 18 U/L
BILIRUB SERPL-MCNC: 0.6 MG/DL
BUN SERPL-MCNC: 18 MG/DL
CALCIUM SERPL-MCNC: 9.6 MG/DL
CHLORIDE SERPL-SCNC: 102 MMOL/L
CO2 SERPL-SCNC: 24 MMOL/L
CREAT SERPL-MCNC: 0.96 MG/DL
ESTRADIOL SERPL-MCNC: 122 PG/ML
GLUCOSE SERPL-MCNC: 66 MG/DL
POTASSIUM SERPL-SCNC: 4.7 MMOL/L
PROT SERPL-MCNC: 7.5 G/DL
SODIUM SERPL-SCNC: 140 MMOL/L
TESTOST SERPL-MCNC: 6.7 NG/DL

## 2021-10-11 ENCOUNTER — RX RENEWAL (OUTPATIENT)
Age: 26
End: 2021-10-11

## 2021-11-19 ENCOUNTER — TRANSCRIPTION ENCOUNTER (OUTPATIENT)
Age: 26
End: 2021-11-19

## 2021-11-19 LAB
ALBUMIN SERPL ELPH-MCNC: 4.8 G/DL
ALP BLD-CCNC: 55 U/L
ALT SERPL-CCNC: 15 U/L
ANION GAP SERPL CALC-SCNC: 14 MMOL/L
AST SERPL-CCNC: 19 U/L
BILIRUB SERPL-MCNC: 0.5 MG/DL
BUN SERPL-MCNC: 20 MG/DL
CALCIUM SERPL-MCNC: 9.7 MG/DL
CHLORIDE SERPL-SCNC: 100 MMOL/L
CO2 SERPL-SCNC: 20 MMOL/L
CREAT SERPL-MCNC: 0.93 MG/DL
ESTRADIOL SERPL-MCNC: 876 PG/ML
GLUCOSE SERPL-MCNC: 90 MG/DL
POTASSIUM SERPL-SCNC: 5 MMOL/L
PROT SERPL-MCNC: 7.7 G/DL
SODIUM SERPL-SCNC: 135 MMOL/L
TESTOST SERPL-MCNC: 5.8 NG/DL

## 2021-11-22 ENCOUNTER — TRANSCRIPTION ENCOUNTER (OUTPATIENT)
Age: 26
End: 2021-11-22

## 2021-11-24 ENCOUNTER — TRANSCRIPTION ENCOUNTER (OUTPATIENT)
Age: 26
End: 2021-11-24

## 2021-12-02 ENCOUNTER — APPOINTMENT (OUTPATIENT)
Dept: TRANSGENDER CARE | Facility: CLINIC | Age: 26
End: 2021-12-02
Payer: COMMERCIAL

## 2021-12-02 VITALS
WEIGHT: 135 LBS | DIASTOLIC BLOOD PRESSURE: 80 MMHG | HEIGHT: 68 IN | TEMPERATURE: 97.1 F | BODY MASS INDEX: 20.46 KG/M2 | HEART RATE: 86 BPM | SYSTOLIC BLOOD PRESSURE: 120 MMHG | OXYGEN SATURATION: 100 %

## 2021-12-02 DIAGNOSIS — J33.9 NASAL POLYP, UNSPECIFIED: ICD-10-CM

## 2021-12-02 PROCEDURE — 36415 COLL VENOUS BLD VENIPUNCTURE: CPT

## 2021-12-02 PROCEDURE — 99213 OFFICE O/P EST LOW 20 MIN: CPT | Mod: 25

## 2021-12-02 NOTE — PHYSICAL EXAM
[No Acute Distress] : no acute distress [Well Nourished] : well nourished [Well Developed] : well developed [Well-Appearing] : well-appearing [Normal Sclera/Conjunctiva] : normal sclera/conjunctiva [EOMI] : extraocular movements intact [Normal Outer Ear/Nose] : the outer ears and nose were normal in appearance [No Lymphadenopathy] : no lymphadenopathy [Supple] : supple [No Respiratory Distress] : no respiratory distress  [No Accessory Muscle Use] : no accessory muscle use [Clear to Auscultation] : lungs were clear to auscultation bilaterally [Normal Rate] : normal rate  [Regular Rhythm] : with a regular rhythm [Normal S1, S2] : normal S1 and S2 [No Murmur] : no murmur heard [No Varicosities] : no varicosities [No Edema] : there was no peripheral edema [No Extremity Clubbing/Cyanosis] : no extremity clubbing/cyanosis [Soft] : abdomen soft [Non Tender] : non-tender [Non-distended] : non-distended [Normal Bowel Sounds] : normal bowel sounds [No Spinal Tenderness] : no spinal tenderness [No Joint Swelling] : no joint swelling [No Rash] : no rash [Coordination Grossly Intact] : coordination grossly intact [No Focal Deficits] : no focal deficits [Normal Gait] : normal gait [Normal Affect] : the affect was normal [Normal Insight/Judgement] : insight and judgment were intact [de-identified] : +polyp in right nare

## 2021-12-02 NOTE — HISTORY OF PRESENT ILLNESS
[FreeTextEntry1] : follow up GAHT [de-identified] : Patient here today for follow up of gender affirming hormone therapy. Feeling well overall. Mood was down for a while, but now better. Positive body changes, chest growth, softening of skin, thinning of body hair and lightening of hair. No chest pain, shortness of breath, or palpitations. Occasional anxiety. No leg swelling or pain. Previously had an anal fissure, but this has resolved now. Does have frequent urination. Taking the Estradiol sublingually now again. Last dose was this AM.

## 2021-12-07 ENCOUNTER — TRANSCRIPTION ENCOUNTER (OUTPATIENT)
Age: 26
End: 2021-12-07

## 2021-12-07 LAB
ALBUMIN SERPL ELPH-MCNC: 5 G/DL
ALP BLD-CCNC: 55 U/L
ALT SERPL-CCNC: 18 U/L
ANION GAP SERPL CALC-SCNC: 20 MMOL/L
AST SERPL-CCNC: 20 U/L
BASOPHILS # BLD AUTO: 0.05 K/UL
BASOPHILS NFR BLD AUTO: 0.5 %
BILIRUB SERPL-MCNC: 0.2 MG/DL
BUN SERPL-MCNC: 15 MG/DL
C TRACH RRNA SPEC QL NAA+PROBE: NOT DETECTED
CALCIUM SERPL-MCNC: 10.4 MG/DL
CHLORIDE SERPL-SCNC: 101 MMOL/L
CO2 SERPL-SCNC: 17 MMOL/L
CREAT SERPL-MCNC: 0.89 MG/DL
EOSINOPHIL # BLD AUTO: 0.03 K/UL
EOSINOPHIL NFR BLD AUTO: 0.3 %
ESTRADIOL SERPL-MCNC: 169 PG/ML
GLUCOSE SERPL-MCNC: 78 MG/DL
HCT VFR BLD CALC: 43.3 %
HGB BLD-MCNC: 14.5 G/DL
HIV1+2 AB SPEC QL IA.RAPID: NONREACTIVE
IMM GRANULOCYTES NFR BLD AUTO: 0.2 %
LYMPHOCYTES # BLD AUTO: 1.75 K/UL
LYMPHOCYTES NFR BLD AUTO: 17.7 %
MAN DIFF?: NORMAL
MCHC RBC-ENTMCNC: 30.5 PG
MCHC RBC-ENTMCNC: 33.5 GM/DL
MCV RBC AUTO: 91 FL
MONOCYTES # BLD AUTO: 0.62 K/UL
MONOCYTES NFR BLD AUTO: 6.3 %
N GONORRHOEA RRNA SPEC QL NAA+PROBE: NOT DETECTED
NEUTROPHILS # BLD AUTO: 7.41 K/UL
NEUTROPHILS NFR BLD AUTO: 75 %
PLATELET # BLD AUTO: 286 K/UL
POTASSIUM SERPL-SCNC: 5.5 MMOL/L
PROT SERPL-MCNC: 7.5 G/DL
RBC # BLD: 4.76 M/UL
RBC # FLD: 11.9 %
SODIUM SERPL-SCNC: 137 MMOL/L
SOURCE AMPLIFICATION: NORMAL
SOURCE ANAL: NORMAL
SOURCE ORAL: NORMAL
T PALLIDUM AB SER QL IA: NEGATIVE
TESTOST SERPL-MCNC: 4.6 NG/DL
TSH SERPL-ACNC: 1.76 UIU/ML
WBC # FLD AUTO: 9.88 K/UL

## 2021-12-16 ENCOUNTER — TRANSCRIPTION ENCOUNTER (OUTPATIENT)
Age: 26
End: 2021-12-16

## 2022-01-19 ENCOUNTER — TRANSCRIPTION ENCOUNTER (OUTPATIENT)
Age: 27
End: 2022-01-19

## 2022-01-20 ENCOUNTER — TRANSCRIPTION ENCOUNTER (OUTPATIENT)
Age: 27
End: 2022-01-20

## 2022-01-20 LAB
ALBUMIN SERPL ELPH-MCNC: 4.8 G/DL
ALP BLD-CCNC: 59 U/L
ALT SERPL-CCNC: 16 U/L
ANION GAP SERPL CALC-SCNC: 14 MMOL/L
AST SERPL-CCNC: 17 U/L
BILIRUB SERPL-MCNC: 0.4 MG/DL
BUN SERPL-MCNC: 18 MG/DL
CALCIUM SERPL-MCNC: 9.9 MG/DL
CHLORIDE SERPL-SCNC: 103 MMOL/L
CO2 SERPL-SCNC: 25 MMOL/L
CREAT SERPL-MCNC: 0.93 MG/DL
ESTRADIOL SERPL-MCNC: 127 PG/ML
GLUCOSE SERPL-MCNC: 61 MG/DL
POTASSIUM SERPL-SCNC: 4.6 MMOL/L
PROT SERPL-MCNC: 7.7 G/DL
SODIUM SERPL-SCNC: 141 MMOL/L
TESTOST SERPL-MCNC: 6.8 NG/DL

## 2022-01-27 ENCOUNTER — APPOINTMENT (OUTPATIENT)
Dept: TRANSGENDER CARE | Facility: CLINIC | Age: 27
End: 2022-01-27
Payer: COMMERCIAL

## 2022-01-27 DIAGNOSIS — Z51.81 ENCOUNTER FOR THERAPEUTIC DRUG LVL MONITORING: ICD-10-CM

## 2022-01-27 PROCEDURE — 99214 OFFICE O/P EST MOD 30 MIN: CPT | Mod: 95

## 2022-01-27 RX ORDER — ONDANSETRON 4 MG/1
4 TABLET, ORALLY DISINTEGRATING ORAL DAILY
Qty: 30 | Refills: 0 | Status: DISCONTINUED | COMMUNITY
Start: 2021-04-19 | End: 2022-01-27

## 2022-01-27 RX ORDER — DIPHENHYDRAMINE HYDROCHLORIDE AND LIDOCAINE HYDROCHLORIDE AND ALUMINUM HYDROXIDE AND MAGNESIUM HYDRO
KIT
Qty: 1 | Refills: 0 | Status: DISCONTINUED | COMMUNITY
Start: 2021-02-22 | End: 2022-01-27

## 2022-01-27 NOTE — HISTORY OF PRESENT ILLNESS
[Home] : at home, [unfilled] , at the time of the visit. [Medical Office: ([facility])___] : at the medical office located in  [Verbal consent obtained from patient] : the patient, [unfilled] [FreeTextEntry1] : follow up labs [de-identified] : Patient scheduled visit today to discuss recent labs and to address other issues. \par Dose of Lithium recently increased 300mg in AM and 300mg in PM. \par Requesting lithium level and thyroid studies. \par Having urinary frequency now and is concern. Taking 50 mg in the evening and 50 mg in the morning. \par Interested in starting on Progesterone. \par Lots of night sweats and hot flashes, sometimes cheeks are flushed. \par Interested in orchiectomy. \par

## 2022-01-27 NOTE — PLAN
[FreeTextEntry1] : \par GAHT: \par Patient reports they have had significant increase in urination since being on Lithium\par Given how low Testosterone is suppressed, I explained that we can decrease dose. Patient has been taking half of a 100 mg tablet BID, so they have been advised to drop it to half of a tablet (50 mg) once per day. If testosterone rises too much, we can add back 50 mg for a total of 100mg taken daily in the morning to avoid having to wake up in the night to urinate, as this is the primary complaint now.\par \par They are interested in Progesterone for increased feminization and for possible increase in energy. We discussed the lack of evidence, and the evidence for risk of clot, stroke, CAD.\par \par Patient reports having night sweats and hot flashes. Unclear etiology, but possibly due to fluctuation in estradiol levels? Discussed that Progesterone could possibly help with this, but would like to avoid making multiple changes at once, so we will revisit this after we handle the frequency issue. \par \par Will put in orders for patient to go to a core lab in about 1 month and check all labs. \par \par F/u after labs. \par

## 2022-01-27 NOTE — PHYSICAL EXAM
[No Acute Distress] : no acute distress [Well Nourished] : well nourished [Well Developed] : well developed [Well-Appearing] : well-appearing [EOMI] : extraocular movements intact [No Respiratory Distress] : no respiratory distress  [Coordination Grossly Intact] : coordination grossly intact [Normal Affect] : the affect was normal [Normal Insight/Judgement] : insight and judgment were intact

## 2022-02-24 ENCOUNTER — TRANSCRIPTION ENCOUNTER (OUTPATIENT)
Age: 27
End: 2022-02-24

## 2022-02-24 LAB
ESTRADIOL SERPL-MCNC: 173 PG/ML
LITHIUM SERPL-SCNC: 0.57 MMOL/L
TESTOST SERPL-MCNC: 11.3 NG/DL
TSH SERPL-ACNC: 2.45 UIU/ML

## 2022-03-03 ENCOUNTER — RX RENEWAL (OUTPATIENT)
Age: 27
End: 2022-03-03

## 2022-03-10 ENCOUNTER — APPOINTMENT (OUTPATIENT)
Dept: TRANSGENDER CARE | Facility: CLINIC | Age: 27
End: 2022-03-10
Payer: COMMERCIAL

## 2022-03-10 PROCEDURE — 99212 OFFICE O/P EST SF 10 MIN: CPT | Mod: 95

## 2022-03-10 NOTE — PHYSICAL EXAM
[No Acute Distress] : no acute distress [Well Nourished] : well nourished [Well Developed] : well developed [Normal Sclera/Conjunctiva] : normal sclera/conjunctiva [EOMI] : extraocular movements intact [No Respiratory Distress] : no respiratory distress  [Coordination Grossly Intact] : coordination grossly intact [Speech Grossly Normal] : speech grossly normal [Normal Affect] : the affect was normal [Normal Mood] : the mood was normal [Normal Insight/Judgement] : insight and judgment were intact

## 2022-03-10 NOTE — PLAN
[FreeTextEntry1] : \par Patient wishes to start on Progesterone. We discussed how there is anecdotal evidence that it can improve feminization, but that there is no good body of evidence to suggest it does or does not. \par We discussed possible increase risk of blood clots, CVA, CAD, mood changes, breast cancer. \par Patient understands risk/benefits and wishes to proceed with treatment with Progesterone. \par F/u in 1 month to check in on progress.

## 2022-03-10 NOTE — HISTORY OF PRESENT ILLNESS
[Home] : at home, [unfilled] , at the time of the visit. [Medical Office: ([facility])___] : at the medical office located in  [Verbal consent obtained from patient] : the patient, [unfilled] [FreeTextEntry1] : wants to start Progesterone [de-identified] : Patient scheduled appointment today to discuss starting on Progesterone. They are feeling well overall, but frequently tired. Wanting to start on Progesterone to help with breast growth, body fat redistribution, and possibly energy level. No other complaints/concerns at this time.

## 2022-04-14 ENCOUNTER — APPOINTMENT (OUTPATIENT)
Dept: TRANSGENDER CARE | Facility: CLINIC | Age: 27
End: 2022-04-14
Payer: COMMERCIAL

## 2022-04-14 PROCEDURE — 99213 OFFICE O/P EST LOW 20 MIN: CPT | Mod: 95

## 2022-04-14 RX ORDER — ISOPROPYL ALCOHOL 0.7 ML/ML
SWAB TOPICAL
Qty: 60 | Refills: 3 | Status: ACTIVE | COMMUNITY
Start: 2022-04-14 | End: 1900-01-01

## 2022-04-14 NOTE — PLAN
[FreeTextEntry1] : \par Patient having flushing with estradiol sublingually. Will trial on injections and see if this alleviates the flushing and night sweats. If not, may need endo eval or further workup for night sweats.\par \par Rx sent for Estradiol valerate and injection supplies, will schedule appointment for injection teaching.

## 2022-04-14 NOTE — PHYSICAL EXAM
[No Acute Distress] : no acute distress [Well Nourished] : well nourished [Well Developed] : well developed [Well-Appearing] : well-appearing [EOMI] : extraocular movements intact [Normal Outer Ear/Nose] : the outer ears and nose were normal in appearance [No Respiratory Distress] : no respiratory distress  [Coordination Grossly Intact] : coordination grossly intact [No Focal Deficits] : no focal deficits [Normal Gait] : normal gait [Normal Affect] : the affect was normal [Normal Insight/Judgement] : insight and judgment were intact

## 2022-04-14 NOTE — HISTORY OF PRESENT ILLNESS
[Home] : at home, [unfilled] , at the time of the visit. [Medical Office: ([facility])___] : at the medical office located in  [Verbal consent obtained from patient] : the patient, [unfilled] [FreeTextEntry1] : f/u CHELSEA [de-identified] : Feeling well overall. Sleeping better. Noticing breast growth, body fat redistribution. Mood has been good.\par \par Having some night sweats. Has been going on since November. Better now, but still present. Also endorses getting a bit flushed after taking Estradiol tablets. \par \par Otherwise well and no complaints.

## 2022-05-02 ENCOUNTER — APPOINTMENT (OUTPATIENT)
Dept: TRANSGENDER CARE | Facility: CLINIC | Age: 27
End: 2022-05-02
Payer: COMMERCIAL

## 2022-05-02 ENCOUNTER — RX RENEWAL (OUTPATIENT)
Age: 27
End: 2022-05-02

## 2022-05-02 VITALS
WEIGHT: 142 LBS | BODY MASS INDEX: 21.52 KG/M2 | HEART RATE: 70 BPM | OXYGEN SATURATION: 99 % | DIASTOLIC BLOOD PRESSURE: 78 MMHG | TEMPERATURE: 97.9 F | SYSTOLIC BLOOD PRESSURE: 126 MMHG | HEIGHT: 68 IN

## 2022-05-02 PROCEDURE — 99213 OFFICE O/P EST LOW 20 MIN: CPT

## 2022-05-02 NOTE — PHYSICAL EXAM
[No Acute Distress] : no acute distress [Well Nourished] : well nourished [Well Developed] : well developed [Well-Appearing] : well-appearing [Normal Sclera/Conjunctiva] : normal sclera/conjunctiva [EOMI] : extraocular movements intact [Normal Outer Ear/Nose] : the outer ears and nose were normal in appearance [No Respiratory Distress] : no respiratory distress  [No Accessory Muscle Use] : no accessory muscle use [Coordination Grossly Intact] : coordination grossly intact [No Focal Deficits] : no focal deficits [Normal Gait] : normal gait [Normal Affect] : the affect was normal [Normal Insight/Judgement] : insight and judgment were intact [de-identified] : +mild hand tremor noted

## 2022-05-02 NOTE — HISTORY OF PRESENT ILLNESS
[FreeTextEntry1] : injection teaching [de-identified] : Patient here today for safe injection teaching. She wishes to transition from oral estradiol to injectable. She has brought her injection supplies here from the pharmacy. Doing well overall, but complains of some hand shaking that her psychiatrist believes could be from her Lithium. \par

## 2022-05-02 NOTE — PLAN
[FreeTextEntry1] : Patient was instructed in safe-injection technique, and then self-injected into abdominal subcutaneous tissue without difficulty.\par Patient to continue injecting .5ML weekly and we will plan to recheck labs at the 3 month elizabet.\par Patient to call with any difficulties over the next few weeks.

## 2022-05-16 ENCOUNTER — RX RENEWAL (OUTPATIENT)
Age: 27
End: 2022-05-16

## 2022-06-20 ENCOUNTER — RX RENEWAL (OUTPATIENT)
Age: 27
End: 2022-06-20

## 2022-06-27 ENCOUNTER — RX RENEWAL (OUTPATIENT)
Age: 27
End: 2022-06-27

## 2022-07-07 ENCOUNTER — RX RENEWAL (OUTPATIENT)
Age: 27
End: 2022-07-07

## 2022-07-26 ENCOUNTER — TRANSCRIPTION ENCOUNTER (OUTPATIENT)
Age: 27
End: 2022-07-26

## 2022-07-26 LAB
ALBUMIN SERPL ELPH-MCNC: 4.5 G/DL
ALP BLD-CCNC: 52 U/L
ALT SERPL-CCNC: 11 U/L
ANION GAP SERPL CALC-SCNC: 11 MMOL/L
AST SERPL-CCNC: 16 U/L
BASOPHILS # BLD AUTO: 0.05 K/UL
BASOPHILS NFR BLD AUTO: 0.5 %
BILIRUB SERPL-MCNC: 0.6 MG/DL
BUN SERPL-MCNC: 12 MG/DL
CALCIUM SERPL-MCNC: 9.2 MG/DL
CHLORIDE SERPL-SCNC: 102 MMOL/L
CO2 SERPL-SCNC: 23 MMOL/L
CREAT SERPL-MCNC: 0.84 MG/DL
EGFR: 124 ML/MIN/1.73M2
EOSINOPHIL # BLD AUTO: 0.07 K/UL
EOSINOPHIL NFR BLD AUTO: 0.6 %
ESTRADIOL SERPL-MCNC: 1078 PG/ML
GLUCOSE SERPL-MCNC: 82 MG/DL
HCT VFR BLD CALC: 41.6 %
HGB BLD-MCNC: 13.6 G/DL
IMM GRANULOCYTES NFR BLD AUTO: 0.3 %
LYMPHOCYTES # BLD AUTO: 1.98 K/UL
LYMPHOCYTES NFR BLD AUTO: 18.2 %
MAN DIFF?: NORMAL
MCHC RBC-ENTMCNC: 30.3 PG
MCHC RBC-ENTMCNC: 32.7 GM/DL
MCV RBC AUTO: 92.7 FL
MONOCYTES # BLD AUTO: 0.65 K/UL
MONOCYTES NFR BLD AUTO: 6 %
NEUTROPHILS # BLD AUTO: 8.1 K/UL
NEUTROPHILS NFR BLD AUTO: 74.4 %
PLATELET # BLD AUTO: 247 K/UL
POTASSIUM SERPL-SCNC: 4.5 MMOL/L
PROT SERPL-MCNC: 7.1 G/DL
RBC # BLD: 4.49 M/UL
RBC # FLD: 11.9 %
SODIUM SERPL-SCNC: 137 MMOL/L
TESTOST SERPL-MCNC: 7.9 NG/DL
WBC # FLD AUTO: 10.88 K/UL

## 2022-08-01 ENCOUNTER — APPOINTMENT (OUTPATIENT)
Dept: TRANSGENDER CARE | Facility: CLINIC | Age: 27
End: 2022-08-01

## 2022-08-08 ENCOUNTER — APPOINTMENT (OUTPATIENT)
Dept: TRANSGENDER CARE | Facility: CLINIC | Age: 27
End: 2022-08-08

## 2022-09-15 ENCOUNTER — APPOINTMENT (OUTPATIENT)
Dept: TRANSGENDER CARE | Facility: CLINIC | Age: 27
End: 2022-09-15

## 2022-09-15 VITALS
BODY MASS INDEX: 21.82 KG/M2 | HEIGHT: 68 IN | DIASTOLIC BLOOD PRESSURE: 84 MMHG | HEART RATE: 75 BPM | TEMPERATURE: 97.8 F | WEIGHT: 144 LBS | OXYGEN SATURATION: 99 % | SYSTOLIC BLOOD PRESSURE: 126 MMHG

## 2022-09-15 PROCEDURE — 36415 COLL VENOUS BLD VENIPUNCTURE: CPT

## 2022-09-19 NOTE — HISTORY OF PRESENT ILLNESS
[FreeTextEntry1] : sti testing, f/u hormone therapy [de-identified] : Doing well overall on Estradiol at adjusted dose. Did shot today. \par Wants routine STI screening today. No symptoms. Previously concerned about an exposure, but that partner ended up not testing positive. Patient has no symptoms. \par \par ROS: No HA, visual changes, dizziness, chest pain, SOB, palpitations, wheezing, cough, abdominal pain, nausea, vomiting, diarrhea, joint pain, leg swelling or leg pain.\par

## 2022-09-19 NOTE — PHYSICAL EXAM
[No Acute Distress] : no acute distress [Well Nourished] : well nourished [Well Developed] : well developed [Well-Appearing] : well-appearing [Normal Sclera/Conjunctiva] : normal sclera/conjunctiva [EOMI] : extraocular movements intact [Normal Outer Ear/Nose] : the outer ears and nose were normal in appearance [Supple] : supple [No Respiratory Distress] : no respiratory distress  [No Accessory Muscle Use] : no accessory muscle use [Clear to Auscultation] : lungs were clear to auscultation bilaterally [Normal Rate] : normal rate  [Regular Rhythm] : with a regular rhythm [Normal S1, S2] : normal S1 and S2 [No Edema] : there was no peripheral edema [No Extremity Clubbing/Cyanosis] : no extremity clubbing/cyanosis [Soft] : abdomen soft [Non Tender] : non-tender [Non-distended] : non-distended [Normal Bowel Sounds] : normal bowel sounds [Grossly Normal Strength/Tone] : grossly normal strength/tone [Coordination Grossly Intact] : coordination grossly intact [Normal Gait] : normal gait [Normal Affect] : the affect was normal [Normal Insight/Judgement] : insight and judgment were intact

## 2022-09-26 ENCOUNTER — RX RENEWAL (OUTPATIENT)
Age: 27
End: 2022-09-26

## 2022-09-26 LAB
ALBUMIN SERPL ELPH-MCNC: 4.8 G/DL
ALP BLD-CCNC: 66 U/L
ALT SERPL-CCNC: 11 U/L
ANION GAP SERPL CALC-SCNC: 11 MMOL/L
AST SERPL-CCNC: 12 U/L
BILIRUB SERPL-MCNC: 0.7 MG/DL
BUN SERPL-MCNC: 12 MG/DL
C TRACH RRNA SPEC QL NAA+PROBE: NOT DETECTED
CALCIUM SERPL-MCNC: 9.8 MG/DL
CHLORIDE SERPL-SCNC: 104 MMOL/L
CO2 SERPL-SCNC: 24 MMOL/L
CREAT SERPL-MCNC: 0.83 MG/DL
EGFR: 124 ML/MIN/1.73M2
ESTRADIOL SERPL-MCNC: 337 PG/ML
ESTRADIOL SERPL-MCNC: 347 PG/ML
GLUCOSE SERPL-MCNC: 82 MG/DL
HBV CORE IGG+IGM SER QL: NONREACTIVE
HBV SURFACE AG SER QL: NONREACTIVE
HCV AB SER QL: NONREACTIVE
HCV S/CO RATIO: 0.31 S/CO
N GONORRHOEA RRNA SPEC QL NAA+PROBE: NOT DETECTED
POTASSIUM SERPL-SCNC: 4.6 MMOL/L
PROT SERPL-MCNC: 7.4 G/DL
SODIUM SERPL-SCNC: 139 MMOL/L
SOURCE AMPLIFICATION: NORMAL
SOURCE ANAL: NORMAL
SOURCE ORAL: NORMAL
T PALLIDUM AB SER QL IA: NEGATIVE
TESTOST SERPL-MCNC: 17 NG/DL
TESTOST SERPL-MCNC: 6.2 NG/DL

## 2022-09-26 RX ORDER — ESTRADIOL 2 MG/1
2 TABLET ORAL
Qty: 270 | Refills: 1 | Status: DISCONTINUED | COMMUNITY
Start: 2020-12-02 | End: 2022-09-26

## 2022-12-20 ENCOUNTER — APPOINTMENT (OUTPATIENT)
Age: 27
End: 2022-12-20

## 2022-12-20 ENCOUNTER — OUTPATIENT (OUTPATIENT)
Dept: OUTPATIENT SERVICES | Facility: HOSPITAL | Age: 27
LOS: 1 days | Discharge: HOME | End: 2022-12-20

## 2022-12-20 PROCEDURE — 99204 OFFICE O/P NEW MOD 45 MIN: CPT | Mod: 95

## 2023-01-09 ENCOUNTER — RX RENEWAL (OUTPATIENT)
Age: 28
End: 2023-01-09

## 2023-01-09 DIAGNOSIS — F33.1 MAJOR DEPRESSIVE DISORDER, RECURRENT, MODERATE: ICD-10-CM

## 2023-01-18 ENCOUNTER — NON-APPOINTMENT (OUTPATIENT)
Age: 28
End: 2023-01-18

## 2023-01-25 ENCOUNTER — APPOINTMENT (OUTPATIENT)
Dept: PSYCHIATRY | Facility: CLINIC | Age: 28
End: 2023-01-25
Payer: MEDICARE

## 2023-01-25 PROCEDURE — 90791 PSYCH DIAGNOSTIC EVALUATION: CPT | Mod: 95

## 2023-01-31 NOTE — PHYSICAL EXAM
[Average] : average [Agitated] : agitated [Cooperative] : cooperative [Mistrustful] : mistrustful [Anxious] : anxious [Full] : full [Clear] : clear [Linear/Goal Directed] : linear/goal directed [WNL] : within normal limits [de-identified] : anxious wringing of hands

## 2023-01-31 NOTE — PHYSICAL EXAM
[Average] : average [Agitated] : agitated [Cooperative] : cooperative [Mistrustful] : mistrustful [Anxious] : anxious [Full] : full [Clear] : clear [Linear/Goal Directed] : linear/goal directed [WNL] : within normal limits [de-identified] : anxious wringing of hands
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
## 2023-01-31 NOTE — REASON FOR VISIT
[Patient preference] : as per patient preference [Other Location: e.g. Home (Enter Location, City,State)___] : The provider was located at [unfilled]. [Home] : The patient, [unfilled], was located at home, [unfilled], at the time of the visit. [Verbal consent obtained from patient/other participant(s)] : Verbal consent for telehealth/telephonic services obtained from patient/other participant(s) [Patient] : Patient [FreeTextEntry4] : 1pm [FreeTextEntry5] : 155pm [FreeTextEntry1] : extended intake evaluation

## 2023-01-31 NOTE — RISK ASSESSMENT
[Yes, patient reports ideation or behavior] : Yes, patient reports ideation or behavior [No, patient denies ideation or behavior] : No, patient denies ideation or behavior [Yes] : Yes [No] : No [Yes, more than three months ago] : Yes, more than three months ago [Moderate acute suicide risk] : Moderate acute suicide risk [FreeTextEntry8] : reports chronic suicidal ideation for past 10 years, at it worst between ages [age] when she considered jumping in front of a subway and went into the subway and waited but never did it. Additionally has history of psychedelic drug use and considered jumping out of a window before friends intervened. reports thinking about jumping off a bridge but has never made any moves toward acting on it. reports when driving will drive recklessly partially in pursuit of change of mental state (exhilaration/adrenaline) but also with passive desire to get in an accident or get arrested. Denies ever deliberately attempting suicide, and reports that in the past month, chronic passive SI,  no current plan or intent.  [FreeTextEntry7] : no evidence of risk of violence at present, but potentially in a manic or hypomanic state impulsivity could be a possibility. [TextBox_32] : r [FreeTextEntry9] : endorses chronic SI, no current plan or intent. given significant trauma history and possibility of being triggered, can be considered moderate risk.

## 2023-01-31 NOTE — BEHAVIORAL HEALTH
[Prevent psychological harm to patient or another individual] : Prevent psychological harm to patient or another individual [FreeTextEntry2] : Interpersonally, pt appears bright and motivated for treatment with some insight into severity of psychiatric distress. However, it should be noted that pt was quite defensive about safety planning. Writer provided some psychoeducation about the nature of trauma treatment and the possibility that early phases of trauma treatment can evoke temporary symptom exacerbation and, given extent of pt's symptomology the possibility of need for hospitalization for suicidality or viola. When writer began to outline this, pt interrupted writer multiple times to assure that this would not be needed, thereby disengaging from safety planning. With encouragement and validation, pt was able to acknowledge having sufficient family support to be taken to the nearest hospital in such an event. Overall, the interaction is indicative of a possible lack of insight into extent and severity of pt's psychological distress and merits caution in pausible provision of individual psychotherapy. Additionally, pt was reiterated disinterest in DBT group as well as [facility] for trans and gender expansive individuals.
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
## 2023-02-01 NOTE — DISCUSSION/SUMMARY
[FreeTextEntry1] : writer obtained consent to speak with pt's psychiatrist and emailed Dr. reeves on monday 1/30. after not hearing back, writer called Dr. Reeves on 2/1/23 and left voicemail.

## 2023-02-02 ENCOUNTER — NON-APPOINTMENT (OUTPATIENT)
Age: 28
End: 2023-02-02

## 2023-02-02 NOTE — DISCUSSION/SUMMARY
[FreeTextEntry1] : After obtaining consent for writer to speak with pt's psychiatrist Dr. Ray Jusitn, writer spoke with him regarding concerns about this clinic's capacity at present to provide appropriate, comprehensive treatment to this pt. Writer explained that the clinic recommends minimally skills groups in addition to intensive trauma-informed psychotherapy, but unfortunately does not currently have availability to provide these services and, given the extent of pt's acute presentation, does not believe it is in the best interest of the pt to remain on a waitlist and instead recommends treatment as soon as possible. Dr. Justin stated disagreement that he felt supportive treatment for this pt would be sufficient but acknowledged understanding and stated he would support pt in finding a referral. Writer also stated this clinic would provide referral options as well.

## 2023-03-09 LAB
ALBUMIN SERPL ELPH-MCNC: 4.6 G/DL
ALP BLD-CCNC: 64 U/L
ALT SERPL-CCNC: 14 U/L
ANION GAP SERPL CALC-SCNC: 11 MMOL/L
AST SERPL-CCNC: 14 U/L
BILIRUB SERPL-MCNC: 0.2 MG/DL
BUN SERPL-MCNC: 16 MG/DL
CALCIUM SERPL-MCNC: 9.4 MG/DL
CHLORIDE SERPL-SCNC: 103 MMOL/L
CO2 SERPL-SCNC: 25 MMOL/L
CREAT SERPL-MCNC: 0.8 MG/DL
EGFR: 125 ML/MIN/1.73M2
ESTRADIOL SERPL-MCNC: 351 PG/ML
GLUCOSE SERPL-MCNC: 84 MG/DL
POTASSIUM SERPL-SCNC: 4.1 MMOL/L
PROT SERPL-MCNC: 7.1 G/DL
SODIUM SERPL-SCNC: 139 MMOL/L
TESTOST SERPL-MCNC: <2.5 NG/DL

## 2023-04-27 ENCOUNTER — LABORATORY RESULT (OUTPATIENT)
Age: 28
End: 2023-04-27

## 2023-04-27 ENCOUNTER — APPOINTMENT (OUTPATIENT)
Dept: TRANSGENDER CARE | Facility: CLINIC | Age: 28
End: 2023-04-27
Payer: MEDICARE

## 2023-04-27 VITALS
DIASTOLIC BLOOD PRESSURE: 76 MMHG | TEMPERATURE: 97.9 F | WEIGHT: 154 LBS | OXYGEN SATURATION: 98 % | BODY MASS INDEX: 23.34 KG/M2 | SYSTOLIC BLOOD PRESSURE: 114 MMHG | HEART RATE: 77 BPM | HEIGHT: 68 IN

## 2023-04-27 DIAGNOSIS — Z11.59 ENCOUNTER FOR SCREENING FOR OTHER VIRAL DISEASES: ICD-10-CM

## 2023-04-27 DIAGNOSIS — Z11.4 ENCOUNTER FOR SCREENING FOR HUMAN IMMUNODEFICIENCY VIRUS [HIV]: ICD-10-CM

## 2023-04-27 DIAGNOSIS — Z11.3 ENCOUNTER FOR SCREENING FOR INFECTIONS WITH A PREDOMINANTLY SEXUAL MODE OF TRANSMISSION: ICD-10-CM

## 2023-04-27 PROCEDURE — 99213 OFFICE O/P EST LOW 20 MIN: CPT | Mod: 25

## 2023-04-27 PROCEDURE — 36415 COLL VENOUS BLD VENIPUNCTURE: CPT

## 2023-04-27 RX ORDER — FLUTICASONE PROPIONATE 50 UG/1
50 SPRAY, METERED NASAL DAILY
Qty: 16 | Refills: 2 | Status: COMPLETED | COMMUNITY
Start: 2021-12-02 | End: 2023-04-27

## 2023-04-27 NOTE — PLAN
[FreeTextEntry1] : \par Gender Dysphoria: Will order routine blood work and f/u results to patient once made available - will send [facility] message with results and dose adjustment pending lab results. F/u 3 months. Advised patient to call with any questions or concerns. Patient verbalized understanding. \par \par Bipolar disorder: stable, patient denies any thoughts of hurting self or anyone else at time of visit. Continue care under therapist. Advised patient to call with any questions or concerns. Patient verbalized understanding. \par \par STI Screen \par - Counseled and discussed PrEP, declined patient not interested at this time\par - Counseled on safe sex practices, risk reduction and condom use. Condoms provided to patient. \par - Aware of indications for both PrEP and nPEP. nPEP must be taken with in the first 72 hours. Must go to nearest ER. \par - Labs done today for HIV, Hepatitis, Syphilis, GC/ CT. Will call pt back with results \par - Triple Site offered, patient only requesting oral swab/urine\par - Advised patient to call with any questions or concerns. Patient verbalized understanding.

## 2023-04-27 NOTE — HISTORY OF PRESENT ILLNESS
[FreeTextEntry1] : f/u [de-identified] : Patient here today for ga f/u. Patient reports she has not had follow up on labs yet. She reports midway during cycle started getting sweaty, emotional changes. She reports she was doing injections every five days previously which was too much. She is interested in switching to weekly injections. Patient reports she is taking 50 mg of spironolactone would like to decrease dose if possible. \par \par Patient is following psychiatrist for bipolar disorder, recent medication changes. She denies any thoughts of hurting herself or anyone else at time of visit. Patient interested in STI testing and to hear about PrEP.\par \par Last injection: 4/23/23 sunday\par \par Patient denies any headache visual changes, dizziness, chest pain, SOB, palpitations, wheezing, cough, abdominal pain, nausea, vomiting, diarrhea, joint pain, leg swelling or leg pain. No other health concerns today.

## 2023-04-27 NOTE — HEALTH RISK ASSESSMENT
[1 or 2 (0 pts)] : 1 or 2 (0 points) [Never (0 pts)] : Never (0 points) [No] : In the past 12 months have you used drugs other than those required for medical reasons? No [Never] : Never [2] : 1) Little interest or pleasure doing things for more than half of the days (2) [1] : 2) Feeling down, depressed, or hopeless for several days (1) [PHQ-2 Positive] : PHQ-2 Positive [1/2 of Days or More (2)] : 5.) Poor appetite or overeating? Half the days or more [Several Days (1)] : 6.) Feeling bad about yourself, or that you are a failure, or have let yourself or your family down? Several days [Nearly Every Day (3)] : 7.) Trouble concentrating on things, such as reading a newspaper or watching television? Nearly every day [Not at All (0)] : 8.) Moving or speaking so slowly that other people could have noticed, or the opposite, moving or speaking faster than usual? Not at all [Moderate] : severity of depression is moderate [Somewhat Difficult] : How difficult have these problems made it for you to do your work, take care of things at home, or get along with people? Somewhat difficult [I have developed a follow-up plan documented below in the note.] : I have developed a follow-up plan documented below in the note. [Audit-CScore] : 0 [RAW8Vptry] : 3 [XEN3HjxlyDanat] : 12 [de-identified] : tobacco in blunt once in a while

## 2023-04-28 ENCOUNTER — TRANSCRIPTION ENCOUNTER (OUTPATIENT)
Age: 28
End: 2023-04-28

## 2023-04-28 LAB
ALBUMIN SERPL ELPH-MCNC: 4.5 G/DL
ALP BLD-CCNC: 69 U/L
ALT SERPL-CCNC: 11 U/L
ANION GAP SERPL CALC-SCNC: 12 MMOL/L
APPEARANCE: CLEAR
AST SERPL-CCNC: 14 U/L
BASOPHILS # BLD AUTO: 0.06 K/UL
BASOPHILS NFR BLD AUTO: 0.4 %
BILIRUB SERPL-MCNC: 0.2 MG/DL
BILIRUBIN URINE: NEGATIVE
BLOOD URINE: NEGATIVE
BUN SERPL-MCNC: 16 MG/DL
C TRACH RRNA SPEC QL NAA+PROBE: NOT DETECTED
C TRACH RRNA SPEC QL NAA+PROBE: NOT DETECTED
CALCIUM SERPL-MCNC: 10 MG/DL
CHLORIDE SERPL-SCNC: 102 MMOL/L
CHOLEST SERPL-MCNC: 211 MG/DL
CO2 SERPL-SCNC: 27 MMOL/L
COLOR: YELLOW
CREAT SERPL-MCNC: 0.94 MG/DL
EGFR: 115 ML/MIN/1.73M2
EOSINOPHIL # BLD AUTO: 0.06 K/UL
EOSINOPHIL NFR BLD AUTO: 0.4 %
ESTIMATED AVERAGE GLUCOSE: 94 MG/DL
ESTRADIOL SERPL-MCNC: 392 PG/ML
GLUCOSE QUALITATIVE U: NEGATIVE MG/DL
GLUCOSE SERPL-MCNC: 85 MG/DL
HBA1C MFR BLD HPLC: 4.9 %
HBV CORE IGG+IGM SER QL: NONREACTIVE
HBV SURFACE AB SERPL IA-ACNC: 73.3 MIU/ML
HBV SURFACE AG SER QL: NONREACTIVE
HCT VFR BLD CALC: 42 %
HCV AB SER QL: NONREACTIVE
HCV S/CO RATIO: 0.45 S/CO
HDLC SERPL-MCNC: 87 MG/DL
HEPATITIS A IGG ANTIBODY: REACTIVE
HGB BLD-MCNC: 13.6 G/DL
HIV1 RNA # SERPL NAA+PROBE: NORMAL
HIV1 RNA # SERPL NAA+PROBE: NORMAL COPIES/ML
HIV1+2 AB SPEC QL IA.RAPID: NONREACTIVE
IMM GRANULOCYTES NFR BLD AUTO: 0.4 %
KETONES URINE: NEGATIVE MG/DL
LDLC SERPL CALC-MCNC: 105 MG/DL
LEUKOCYTE ESTERASE URINE: NEGATIVE
LYMPHOCYTES # BLD AUTO: 2.08 K/UL
LYMPHOCYTES NFR BLD AUTO: 15.2 %
MAN DIFF?: NORMAL
MCHC RBC-ENTMCNC: 29.5 PG
MCHC RBC-ENTMCNC: 32.4 GM/DL
MCV RBC AUTO: 91.1 FL
MONOCYTES # BLD AUTO: 0.65 K/UL
MONOCYTES NFR BLD AUTO: 4.8 %
N GONORRHOEA RRNA SPEC QL NAA+PROBE: NOT DETECTED
N GONORRHOEA RRNA SPEC QL NAA+PROBE: NOT DETECTED
NEUTROPHILS # BLD AUTO: 10.74 K/UL
NEUTROPHILS NFR BLD AUTO: 78.8 %
NITRITE URINE: NEGATIVE
NONHDLC SERPL-MCNC: 124 MG/DL
PH URINE: 6.5
PLATELET # BLD AUTO: 306 K/UL
POTASSIUM SERPL-SCNC: 4.8 MMOL/L
PROT SERPL-MCNC: 7.5 G/DL
PROTEIN URINE: NEGATIVE MG/DL
RBC # BLD: 4.61 M/UL
RBC # FLD: 12 %
SODIUM SERPL-SCNC: 140 MMOL/L
SOURCE AMPLIFICATION: NORMAL
SOURCE ORAL: NORMAL
SPECIFIC GRAVITY URINE: 1.02
T PALLIDUM AB SER QL IA: NEGATIVE
TESTOST SERPL-MCNC: 7 NG/DL
TRIGL SERPL-MCNC: 96 MG/DL
TSH SERPL-ACNC: 2.52 UIU/ML
UROBILINOGEN URINE: 0.2 MG/DL
VIRAL LOAD INTERP: NORMAL
VIRAL LOAD LOG: NORMAL LG COP/ML
WBC # FLD AUTO: 13.65 K/UL

## 2023-04-29 ENCOUNTER — TRANSCRIPTION ENCOUNTER (OUTPATIENT)
Age: 28
End: 2023-04-29

## 2023-06-22 RX ORDER — NEEDLES, SAFETY 18GX1 1/2"
25G X 1" NEEDLE, DISPOSABLE MISCELLANEOUS
Qty: 12 | Refills: 3 | Status: COMPLETED | COMMUNITY
Start: 2022-04-14 | End: 2023-06-22

## 2023-06-22 RX ORDER — SYRINGE AND NEEDLE,INSULIN,1ML 25GX1"
25G X 5/8" SYRINGE, EMPTY DISPOSABLE MISCELLANEOUS
Qty: 12 | Refills: 2 | Status: COMPLETED | COMMUNITY
Start: 2022-08-08 | End: 2023-06-22

## 2023-06-22 RX ORDER — SYRINGE, DISPOSABLE, 1 ML
1 ML SYRINGE, EMPTY DISPOSABLE MISCELLANEOUS
Qty: 1 | Refills: 3 | Status: ACTIVE | COMMUNITY
Start: 2023-06-22 | End: 1900-01-01

## 2023-06-22 RX ORDER — NEEDLES, DISPOSABLE 25GX5/8"
18G X 1" NEEDLE, DISPOSABLE MISCELLANEOUS
Qty: 12 | Refills: 3 | Status: COMPLETED | COMMUNITY
Start: 2022-04-14 | End: 2023-06-22

## 2023-06-29 RX ORDER — NEEDLES, DISPOSABLE 25GX5/8"
23G X 1" NEEDLE, DISPOSABLE MISCELLANEOUS
Qty: 1 | Refills: 2 | Status: COMPLETED | COMMUNITY
Start: 2023-06-22 | End: 2023-06-29

## 2023-07-27 ENCOUNTER — APPOINTMENT (OUTPATIENT)
Dept: TRANSGENDER CARE | Facility: CLINIC | Age: 28
End: 2023-07-27
Payer: MEDICARE

## 2023-07-27 VITALS
WEIGHT: 144 LBS | OXYGEN SATURATION: 99 % | TEMPERATURE: 97 F | SYSTOLIC BLOOD PRESSURE: 124 MMHG | DIASTOLIC BLOOD PRESSURE: 66 MMHG | HEART RATE: 65 BPM | HEIGHT: 68 IN | BODY MASS INDEX: 21.82 KG/M2

## 2023-07-27 DIAGNOSIS — E78.00 PURE HYPERCHOLESTEROLEMIA, UNSPECIFIED: ICD-10-CM

## 2023-07-27 PROCEDURE — 36415 COLL VENOUS BLD VENIPUNCTURE: CPT

## 2023-07-27 PROCEDURE — 99214 OFFICE O/P EST MOD 30 MIN: CPT | Mod: 25

## 2023-07-27 RX ORDER — ARIPIPRAZOLE 5 MG/1
5 TABLET ORAL DAILY
Refills: 0 | Status: COMPLETED | COMMUNITY
Start: 2023-04-27 | End: 2023-07-27

## 2023-07-27 NOTE — HEALTH RISK ASSESSMENT
[1 or 2 (0 pts)] : 1 or 2 (0 points) [Never (0 pts)] : Never (0 points) [No] : In the past 12 months have you used drugs other than those required for medical reasons? No [2] : 1) Little interest or pleasure doing things for more than half of the days (2) [1] : 2) Feeling down, depressed, or hopeless for several days (1) [PHQ-2 Positive] : PHQ-2 Positive [1/2 of Days or More (2)] : 5.) Poor appetite or overeating? Half the days or more [Several Days (1)] : 6.) Feeling bad about yourself, or that you are a failure, or have let yourself or your family down? Several days [Nearly Every Day (3)] : 7.) Trouble concentrating on things, such as reading a newspaper or watching television? Nearly every day [Not at All (0)] : 8.) Moving or speaking so slowly that other people could have noticed, or the opposite, moving or speaking faster than usual? Not at all [Moderate] : severity of depression is moderate [Somewhat Difficult] : How difficult have these problems made it for you to do your work, take care of things at home, or get along with people? Somewhat difficult [I have developed a follow-up plan documented below in the note.] : I have developed a follow-up plan documented below in the note. [Never] : Never [Audit-CScore] : 0 [SVB9Rkpek] : 3 [XKM1ToxcuQeoms] : 12 [de-identified] : tobacco in blunt once in a while [HIV test declined] : HIV test declined [Hepatitis C test declined] : Hepatitis C test declined

## 2023-07-27 NOTE — HISTORY OF PRESENT ILLNESS
[FreeTextEntry1] : f/u [de-identified] : Patient reports feeling well on gaht, she reports doing well on lowered dose of estradiol. Patient reports that she has noted some physical changes on gaht, going well. She denies any emotional changes with gaht. Patient hoping to d/c celine if t levels are still low.\par \par Patient reports that her abilify was recently d/c by psychiatry, she reports losing weight since then bc of med stopping - wants to recheck lipids, is not fasting today. She reports not working or in school currently.\par \par Last injection: sun 7/23/23\par \par Patient denies any headache visual changes, dizziness, chest pain, SOB, palpitations, wheezing, cough, abdominal pain, nausea, vomiting, diarrhea, joint pain, leg swelling or leg pain. No other health concerns today.

## 2023-07-27 NOTE — PLAN
[FreeTextEntry1] : \par Gender Dysphoria: Will order routine blood work and f/u results to patient once made available - will send [facility] message with results and dose adjustment pending lab results. F/u 3 months. Advised patient to call with any questions or concerns. Patient verbalized understanding. \par \par Bipolar disorder: stable, patient denies any thoughts of hurting self or anyone else at time of visit. Continue care under therapist/psych. Advised patient to call with any questions or concerns. Patient verbalized understanding.

## 2023-07-28 LAB
ALBUMIN SERPL ELPH-MCNC: 4.8 G/DL
ALP BLD-CCNC: 57 U/L
ALT SERPL-CCNC: 14 U/L
ANION GAP SERPL CALC-SCNC: 11 MMOL/L
AST SERPL-CCNC: 15 U/L
BILIRUB SERPL-MCNC: 0.6 MG/DL
BUN SERPL-MCNC: 14 MG/DL
CALCIUM SERPL-MCNC: 9.8 MG/DL
CHLORIDE SERPL-SCNC: 103 MMOL/L
CHOLEST SERPL-MCNC: 209 MG/DL
CO2 SERPL-SCNC: 24 MMOL/L
CREAT SERPL-MCNC: 0.79 MG/DL
EGFR: 126 ML/MIN/1.73M2
ESTRADIOL SERPL-MCNC: 320 PG/ML
GLUCOSE SERPL-MCNC: 67 MG/DL
HDLC SERPL-MCNC: 94 MG/DL
LDLC SERPL CALC-MCNC: 107 MG/DL
NONHDLC SERPL-MCNC: 115 MG/DL
POTASSIUM SERPL-SCNC: 4.3 MMOL/L
PROT SERPL-MCNC: 7.4 G/DL
SODIUM SERPL-SCNC: 138 MMOL/L
TESTOST SERPL-MCNC: 10 NG/DL
TRIGL SERPL-MCNC: 43 MG/DL

## 2023-08-10 ENCOUNTER — RX RENEWAL (OUTPATIENT)
Age: 28
End: 2023-08-10

## 2023-10-26 ENCOUNTER — APPOINTMENT (OUTPATIENT)
Dept: TRANSGENDER CARE | Facility: CLINIC | Age: 28
End: 2023-10-26
Payer: MEDICARE

## 2023-10-26 ENCOUNTER — OUTPATIENT (OUTPATIENT)
Dept: OUTPATIENT SERVICES | Facility: HOSPITAL | Age: 28
LOS: 1 days | End: 2023-10-26
Payer: MEDICARE

## 2023-10-26 ENCOUNTER — RESULT REVIEW (OUTPATIENT)
Age: 28
End: 2023-10-26

## 2023-10-26 ENCOUNTER — APPOINTMENT (OUTPATIENT)
Dept: ULTRASOUND IMAGING | Facility: IMAGING CENTER | Age: 28
End: 2023-10-26

## 2023-10-26 VITALS
HEART RATE: 73 BPM | HEIGHT: 68 IN | TEMPERATURE: 98.1 F | OXYGEN SATURATION: 98 % | WEIGHT: 137 LBS | SYSTOLIC BLOOD PRESSURE: 120 MMHG | BODY MASS INDEX: 20.76 KG/M2 | RESPIRATION RATE: 16 BRPM | DIASTOLIC BLOOD PRESSURE: 70 MMHG

## 2023-10-26 DIAGNOSIS — M79.602 PAIN IN LEFT ARM: ICD-10-CM

## 2023-10-26 PROCEDURE — 36415 COLL VENOUS BLD VENIPUNCTURE: CPT

## 2023-10-26 PROCEDURE — 99214 OFFICE O/P EST MOD 30 MIN: CPT

## 2023-10-26 PROCEDURE — 93971 EXTREMITY STUDY: CPT | Mod: 26,LT

## 2023-10-26 PROCEDURE — 93971 EXTREMITY STUDY: CPT

## 2023-10-26 RX ORDER — LITHIUM CARBONATE 300 MG/1
300 CAPSULE ORAL DAILY
Refills: 0 | Status: COMPLETED | COMMUNITY
End: 2023-10-26

## 2023-10-26 RX ORDER — NEEDLES, DISPOSABLE 25GX5/8"
18G X 1" NEEDLE, DISPOSABLE MISCELLANEOUS
Qty: 1 | Refills: 3 | Status: ACTIVE | COMMUNITY
Start: 2023-06-22 | End: 1900-01-01

## 2023-10-26 RX ORDER — LITHIUM CARBONATE 450 MG/1
450 TABLET ORAL
Qty: 30 | Refills: 0 | Status: ACTIVE | COMMUNITY
Start: 2023-07-21

## 2023-10-26 RX ORDER — PRAZOSIN HYDROCHLORIDE 1 MG/1
1 CAPSULE ORAL DAILY
Refills: 0 | Status: COMPLETED | COMMUNITY
Start: 2023-04-27 | End: 2023-10-26

## 2023-10-26 RX ORDER — ADHESIVE TAPE 3"X 2.3 YD
50 MCG TAPE, NON-MEDICATED TOPICAL
Qty: 90 | Refills: 2 | Status: COMPLETED | COMMUNITY
Start: 2020-09-16 | End: 2023-10-26

## 2023-10-27 ENCOUNTER — TRANSCRIPTION ENCOUNTER (OUTPATIENT)
Age: 28
End: 2023-10-27

## 2023-10-27 LAB
ALBUMIN SERPL ELPH-MCNC: 4.8 G/DL
ALP BLD-CCNC: 54 U/L
ALT SERPL-CCNC: 12 U/L
ANION GAP SERPL CALC-SCNC: 12 MMOL/L
AST SERPL-CCNC: 14 U/L
BASOPHILS # BLD AUTO: 0.05 K/UL
BASOPHILS NFR BLD AUTO: 0.6 %
BILIRUB SERPL-MCNC: 0.6 MG/DL
BUN SERPL-MCNC: 10 MG/DL
CALCIUM SERPL-MCNC: 9.5 MG/DL
CHLORIDE SERPL-SCNC: 104 MMOL/L
CO2 SERPL-SCNC: 23 MMOL/L
CREAT SERPL-MCNC: 0.79 MG/DL
EGFR: 105 ML/MIN/1.73M2
EOSINOPHIL # BLD AUTO: 0.1 K/UL
EOSINOPHIL NFR BLD AUTO: 1.2 %
ESTRADIOL SERPL-MCNC: 128 PG/ML
GLUCOSE SERPL-MCNC: 87 MG/DL
HCT VFR BLD CALC: 41 %
HGB BLD-MCNC: 13.6 G/DL
IMM GRANULOCYTES NFR BLD AUTO: 0.2 %
LYMPHOCYTES # BLD AUTO: 1.58 K/UL
LYMPHOCYTES NFR BLD AUTO: 19.5 %
MAN DIFF?: NORMAL
MCHC RBC-ENTMCNC: 30.5 PG
MCHC RBC-ENTMCNC: 33.2 GM/DL
MCV RBC AUTO: 91.9 FL
MONOCYTES # BLD AUTO: 0.49 K/UL
MONOCYTES NFR BLD AUTO: 6 %
NEUTROPHILS # BLD AUTO: 5.87 K/UL
NEUTROPHILS NFR BLD AUTO: 72.5 %
PLATELET # BLD AUTO: 281 K/UL
POTASSIUM SERPL-SCNC: 4.8 MMOL/L
PROT SERPL-MCNC: 7.4 G/DL
RBC # BLD: 4.46 M/UL
RBC # FLD: 12.7 %
SODIUM SERPL-SCNC: 139 MMOL/L
TESTOST SERPL-MCNC: 23.9 NG/DL
WBC # FLD AUTO: 8.11 K/UL

## 2024-01-11 ENCOUNTER — APPOINTMENT (OUTPATIENT)
Dept: TRANSGENDER CARE | Facility: CLINIC | Age: 29
End: 2024-01-11

## 2024-01-17 ENCOUNTER — RX RENEWAL (OUTPATIENT)
Age: 29
End: 2024-01-17

## 2024-01-17 RX ORDER — ESTRADIOL VALERATE 20 MG/ML
20 INJECTION INTRAMUSCULAR
Qty: 5 | Refills: 0 | Status: ACTIVE | COMMUNITY
Start: 2022-04-14 | End: 1900-01-01

## 2024-02-08 ENCOUNTER — APPOINTMENT (OUTPATIENT)
Dept: TRANSGENDER CARE | Facility: CLINIC | Age: 29
End: 2024-02-08
Payer: MEDICARE

## 2024-02-08 VITALS
HEIGHT: 68 IN | DIASTOLIC BLOOD PRESSURE: 82 MMHG | HEART RATE: 82 BPM | TEMPERATURE: 98.1 F | BODY MASS INDEX: 20.16 KG/M2 | WEIGHT: 133 LBS | SYSTOLIC BLOOD PRESSURE: 130 MMHG | OXYGEN SATURATION: 100 %

## 2024-02-08 DIAGNOSIS — Z00.00 ENCOUNTER FOR GENERAL ADULT MEDICAL EXAMINATION W/OUT ABNORMAL FINDINGS: ICD-10-CM

## 2024-02-08 PROCEDURE — 99214 OFFICE O/P EST MOD 30 MIN: CPT

## 2024-02-08 PROCEDURE — G2211 COMPLEX E/M VISIT ADD ON: CPT

## 2024-02-08 PROCEDURE — 36415 COLL VENOUS BLD VENIPUNCTURE: CPT

## 2024-02-08 RX ORDER — PROGESTERONE 100 MG/1
100 CAPSULE ORAL
Qty: 90 | Refills: 0 | Status: COMPLETED | COMMUNITY
Start: 2022-03-10 | End: 2024-02-08

## 2024-02-08 RX ORDER — ZOLPIDEM TARTRATE 5 MG/1
5 TABLET, FILM COATED ORAL
Qty: 30 | Refills: 0 | Status: COMPLETED | COMMUNITY
Start: 2023-04-27 | End: 2024-02-08

## 2024-02-08 NOTE — HEALTH RISK ASSESSMENT
[HIV test declined] : HIV test declined [Hepatitis C test declined] : Hepatitis C test declined [1 or 2 (0 pts)] : 1 or 2 (0 points) [Never (0 pts)] : Never (0 points) [No] : In the past 12 months have you used drugs other than those required for medical reasons? No [Patient refused screening] : Patient refused screening [de-identified] : DECLINED SBIRT [de-identified] : patient declines d/t bipolar dx, following psych [Patient unable to screen] : Patient unable to screen [de-identified] : DECLINED

## 2024-02-08 NOTE — PLAN
[FreeTextEntry1] : Gender Dysphoria: Will order routine blood work and f/u results to patient once made available - will send [facility] message with results and dose adjustment pending lab results. F/u 3 months pending lab results. Advised patient to call with any questions or concerns. Patient verbalized understanding.   Bipolar disorder: obtain hipaa to discuss with psychiatrist need for adjustment with gaht. stable, patient denies any thoughts of hurting self or anyone else at time of visit. Continue care under therapist/psych. Advised patient to call with any questions or concerns. Patient verbalized understanding.

## 2024-02-08 NOTE — HISTORY OF PRESENT ILLNESS
[FreeTextEntry1] : f/u [de-identified] : Patient here today for f/u  She reports progesterone not covered, needs to be more psych stable prior to try it. She reports feeling good on celine but feels like estradiol is low, hasnt been able to get labs done. Patient started lamotrigine in nov, felt like levels were off start of this year.   Patient following with psychiatrist and therapist. Denies thoughts of hurting self or anyone else. She reports that estradiol can affect her lamictal levels psych is aware, if estradiol levels are low need to work with psychiatrist to determine dosing as psychiatrist reports lamictal is clinically indicated. She has blood work from psych she needs to get done as well.  Last injection: sun 2/4/24  Sexual Hx: Relationship status: single/dating Partners (tell me about the genders and bodies of partners, any sperm producing partners): transwoman Practices (types of sex, monogamous/polyamorous): multiple partners - oral  Protections from STIs (condoms, OCP, LARC, PrEP, etc.): condoms with pentrative sex Past Hx of STIs: none Pregnancy (Desire or Hx): n/a LMP: n/a  Patient reports 1 sexual partners in the last 3 months. Last sexual encounter was 1 week ago  Patient denies any headache visual changes, dizziness, chest pain, SOB, palpitations, wheezing, cough, abdominal pain, nausea, vomiting, diarrhea, joint pain, leg swelling or leg pain. No other health concerns today.

## 2024-02-09 ENCOUNTER — TRANSCRIPTION ENCOUNTER (OUTPATIENT)
Age: 29
End: 2024-02-09

## 2024-02-09 LAB
25(OH)D3 SERPL-MCNC: 22.9 NG/ML
ALBUMIN SERPL ELPH-MCNC: 4.6 G/DL
ALP BLD-CCNC: 69 U/L
ALT SERPL-CCNC: 11 U/L
ANION GAP SERPL CALC-SCNC: 15 MMOL/L
AST SERPL-CCNC: 15 U/L
BASOPHILS # BLD AUTO: 0.05 K/UL
BASOPHILS NFR BLD AUTO: 0.7 %
BILIRUB SERPL-MCNC: 0.3 MG/DL
BUN SERPL-MCNC: 13 MG/DL
CALCIUM SERPL-MCNC: 9.5 MG/DL
CHLORIDE SERPL-SCNC: 104 MMOL/L
CHOLEST SERPL-MCNC: 243 MG/DL
CO2 SERPL-SCNC: 22 MMOL/L
CREAT SERPL-MCNC: 0.78 MG/DL
EGFR: 107 ML/MIN/1.73M2
EOSINOPHIL # BLD AUTO: 0.1 K/UL
EOSINOPHIL NFR BLD AUTO: 1.4 %
ESTIMATED AVERAGE GLUCOSE: 88 MG/DL
ESTRADIOL SERPL-MCNC: 117 PG/ML
GLUCOSE SERPL-MCNC: 75 MG/DL
HBA1C MFR BLD HPLC: 4.7 %
HCT VFR BLD CALC: 39.8 %
HDLC SERPL-MCNC: 96 MG/DL
HGB BLD-MCNC: 13.1 G/DL
IMM GRANULOCYTES NFR BLD AUTO: 0.3 %
LDLC SERPL CALC-MCNC: 127 MG/DL
LITHIUM SERPL-SCNC: 0.3 MMOL/L
LYMPHOCYTES # BLD AUTO: 1.5 K/UL
LYMPHOCYTES NFR BLD AUTO: 21 %
MAN DIFF?: NORMAL
MCHC RBC-ENTMCNC: 29.2 PG
MCHC RBC-ENTMCNC: 32.9 GM/DL
MCV RBC AUTO: 88.8 FL
MONOCYTES # BLD AUTO: 0.38 K/UL
MONOCYTES NFR BLD AUTO: 5.3 %
NEUTROPHILS # BLD AUTO: 5.1 K/UL
NEUTROPHILS NFR BLD AUTO: 71.3 %
NONHDLC SERPL-MCNC: 148 MG/DL
PLATELET # BLD AUTO: 283 K/UL
POTASSIUM SERPL-SCNC: 4.5 MMOL/L
PROT SERPL-MCNC: 6.9 G/DL
RBC # BLD: 4.48 M/UL
RBC # FLD: 12.7 %
SODIUM SERPL-SCNC: 141 MMOL/L
T4 FREE SERPL-MCNC: 1.2 NG/DL
TESTOST SERPL-MCNC: 18.5 NG/DL
TRIGL SERPL-MCNC: 123 MG/DL
TSH SERPL-ACNC: 0.44 UIU/ML
WBC # FLD AUTO: 7.15 K/UL

## 2024-02-13 LAB — LAMOTRIGINE SERPL-MCNC: 2.5 UG/ML

## 2024-03-04 RX ORDER — NEEDLES, DISPOSABLE 25GX5/8"
25G X 5/8" NEEDLE, DISPOSABLE MISCELLANEOUS
Qty: 12 | Refills: 1 | Status: ACTIVE | COMMUNITY
Start: 2023-06-29 | End: 1900-01-01

## 2024-03-07 ENCOUNTER — APPOINTMENT (OUTPATIENT)
Dept: TRANSGENDER CARE | Facility: CLINIC | Age: 29
End: 2024-03-07
Payer: MEDICARE

## 2024-03-07 PROCEDURE — 99212 OFFICE O/P EST SF 10 MIN: CPT

## 2024-03-07 RX ORDER — LAMOTRIGINE 100 MG/1
100 TABLET, EXTENDED RELEASE ORAL DAILY
Refills: 0 | Status: DISCONTINUED | COMMUNITY
Start: 2024-02-08 | End: 2024-03-07

## 2024-03-07 RX ORDER — NEEDLES, DISPOSABLE 25GX5/8"
23G X 1" NEEDLE, DISPOSABLE MISCELLANEOUS
Qty: 1 | Refills: 2 | Status: ACTIVE | COMMUNITY
Start: 2024-03-07 | End: 1900-01-01

## 2024-03-07 RX ORDER — LAMOTRIGINE 50 MG/1
50 TABLET ORAL DAILY
Refills: 0 | Status: ACTIVE | COMMUNITY
Start: 2024-03-07

## 2024-03-07 NOTE — HISTORY OF PRESENT ILLNESS
[de-identified] : Patient here today for f/u  Patient reports she was unable to do injections previously but has now lost weight. She has been on injections for 2 years and now has been more painful. She lost 35 pounds after coming off psychiatric medication. Patient reports has always been doing subq in stomach. She is nervous about IM but would be willing to try.  She reports progesterone not covered, needs to be more psych stable prior to try it. She reports feeling good on celine but feels like estradiol is low, hasnt been able to get labs done. Patient started lamotrigine in nov, felt like levels were off start of this year.   Patient following with psychiatrist and therapist. Denies thoughts of hurting self or anyone else. She reports she was able to get labs over to psychiatrist, now issues just decreased lamictal to 50 mg 1qd.  Patient denies any headache visual changes, dizziness, chest pain, SOB, palpitations, wheezing, cough, abdominal pain, nausea, vomiting, diarrhea, joint pain, leg swelling or leg pain. No other health concerns today.  [FreeTextEntry1] : f/u

## 2024-03-07 NOTE — PLAN
Pt called quite upset asking if PCP still worked here at this clinic.  Pt said, \"I received a letter from [facility] but signed by Dr. Bernarod Atkinson. I thought she started working at [facility]. Please have the nurse call me please.\" [phone] (home)     [FreeTextEntry1] : Gender Dysphoria: will trial switching to IM injections to see how pt tolerates, needle rx sent. She will call to schedule injection training for IM injections. Advised patient to call with any questions or concerns. Patient verbalized understanding.   Bipolar disorder: stable, patient denies any thoughts of hurting self or anyone else at time of visit. Continue care under therapist/psych. Advised patient to call with any questions or concerns. Patient verbalized understanding.

## 2024-03-22 ENCOUNTER — APPOINTMENT (OUTPATIENT)
Dept: TRANSGENDER CARE | Facility: CLINIC | Age: 29
End: 2024-03-22
Payer: MEDICARE

## 2024-03-22 DIAGNOSIS — F31.81 BIPOLAR II DISORDER: ICD-10-CM

## 2024-03-22 PROCEDURE — 99215 OFFICE O/P EST HI 40 MIN: CPT

## 2024-03-27 PROBLEM — F31.81 BIPOLAR 2 DISORDER, MAJOR DEPRESSIVE EPISODE: Status: ACTIVE | Noted: 2023-01-27

## 2024-04-04 ENCOUNTER — APPOINTMENT (OUTPATIENT)
Dept: TRANSGENDER CARE | Facility: CLINIC | Age: 29
End: 2024-04-04
Payer: MEDICARE

## 2024-04-04 VITALS
TEMPERATURE: 98.2 F | RESPIRATION RATE: 18 BRPM | HEART RATE: 79 BPM | WEIGHT: 131 LBS | OXYGEN SATURATION: 99 % | BODY MASS INDEX: 19.85 KG/M2 | SYSTOLIC BLOOD PRESSURE: 118 MMHG | DIASTOLIC BLOOD PRESSURE: 74 MMHG | HEIGHT: 68 IN

## 2024-04-04 DIAGNOSIS — F64.9 GENDER IDENTITY DISORDER, UNSPECIFIED: ICD-10-CM

## 2024-04-04 DIAGNOSIS — F39 UNSPECIFIED MOOD [AFFECTIVE] DISORDER: ICD-10-CM

## 2024-04-04 PROCEDURE — G2211 COMPLEX E/M VISIT ADD ON: CPT

## 2024-04-04 PROCEDURE — 99215 OFFICE O/P EST HI 40 MIN: CPT

## 2024-04-04 NOTE — HISTORY OF PRESENT ILLNESS
[de-identified] : Vasovagaled from IM inj  Has been doing subQ with uncle who is MD  Last inj was Saturday  Has several concerns - has issues w/ thumb dexterity, and some tremor 2/2 lithium rx Since losing significant weight, subQ became challenging for her Has not responded well to prior oral regimen, not interested in patches  Overall happy w inj except method of inj itself a struggle Would like to practice/review today with saline   Patient is interested in orchiectomy however retaining sexual fxn is important to her, has not been an issue for her on estradiol so far.  Discussed could utilize testosterone HRT if needed for sexual fxn s/p orchiectomy, as her concerns around this surgery are not primarily to remove endogenous testosterone but to remove a body part that is dysphoric to her.

## 2024-04-04 NOTE — ASSESSMENT
[FreeTextEntry1] : #GAC Patient successfully demonstrated 2 subQ inj of sterile saline to bilateral abdomen, rtc if difficulty continues at home Orchiectomy discussed, strategies for ED discussed, patient to continue to consider, interested in consult with surgeon, will pursue PRN  At this time without ED issues and declines need for medical mgmt thereof

## 2024-04-05 PROBLEM — F64.0 GENDER DYSPHORIA IN ADOLESCENT AND ADULT: Status: ACTIVE | Noted: 2021-06-03

## 2024-04-12 ENCOUNTER — APPOINTMENT (OUTPATIENT)
Dept: PLASTIC SURGERY | Facility: CLINIC | Age: 29
End: 2024-04-12
Payer: MEDICARE

## 2024-04-12 VITALS
WEIGHT: 130 LBS | RESPIRATION RATE: 16 BRPM | HEART RATE: 60 BPM | BODY MASS INDEX: 19.7 KG/M2 | HEIGHT: 68 IN | DIASTOLIC BLOOD PRESSURE: 76 MMHG | OXYGEN SATURATION: 99 % | SYSTOLIC BLOOD PRESSURE: 123 MMHG

## 2024-04-12 DIAGNOSIS — F64.0 TRANSSEXUALISM: ICD-10-CM

## 2024-04-12 PROCEDURE — 99204 OFFICE O/P NEW MOD 45 MIN: CPT

## 2024-04-15 ENCOUNTER — NON-APPOINTMENT (OUTPATIENT)
Age: 29
End: 2024-04-15

## 2024-05-16 ENCOUNTER — APPOINTMENT (OUTPATIENT)
Dept: TRANSGENDER CARE | Facility: CLINIC | Age: 29
End: 2024-05-16

## 2024-05-23 RX ORDER — SPIRONOLACTONE 25 MG/1
25 TABLET ORAL
Qty: 90 | Refills: 0 | Status: ACTIVE | COMMUNITY
Start: 2020-12-02 | End: 1900-01-01

## 2024-06-02 NOTE — HISTORY OF PRESENT ILLNESS
[FreeTextEntry1] : 27yo woman designated male at birth ([place]; she/they) presents for consultation for orchiectomy. She is not at all interested in vaginoplasty. She has been on feminizing hormones for 3.5 years. She hasn't had sperm stored for fertility preservation and expresses understanding that no additional sperm would be able to be retrievable following orchiectomy. She is not interested in doing so and expresses understanding that fertility would be irreversibly lost following this procedure. She denies any hernias or masses. She is circumcised has not undergone any other genital procedures. She denies any history of DVT/PE. She also denies any history of issues with general anesthesia.  Patient is currently on disability for history of bipolar disorder. Lives with alone but father will be supportive after surgery; she plans to stay with her father. Endorses marijuana use. The patient agreed to avoid all marijuana smoking for 6 weeks before surgery and 6 weeks after surgery and to avoid all THC use for 2 weeks before and after surgery. Denies tobacco, alcohol, and any other recreational drug use. Patient denies any history of psychiatric hospitalization or admission in the last 2 years.

## 2024-06-02 NOTE — ASSESSMENT
[FreeTextEntry1] : The patient is specifically interested in an orchiectomy. She expresses understanding of the risks associated with surgery as listed above. To proceed, she will need 2 letters of assessment in accordance with [place] Medicaid guidelines.

## 2024-06-02 NOTE — ADDENDUM
[FreeTextEntry1] : 2024-06-02 The patient has a letter of support from Ray Vitale MD, psychiatrist, and from Ella Sapp DO. We will submit for insurance authorization.

## 2024-06-02 NOTE — PHYSICAL EXAM
[de-identified] : NAD. BMI 19.8 [de-identified] : Normal respiratory effort. [de-identified] : Genital exam was performed with a medical assistant chaperone present (EC).  She is circumcised.  There are no palpable testicular masses present. There are no palpable hernias. There are no visible skin lesions.

## 2024-07-17 ENCOUNTER — NON-APPOINTMENT (OUTPATIENT)
Age: 29
End: 2024-07-17

## 2024-07-17 ENCOUNTER — APPOINTMENT (OUTPATIENT)
Dept: TRANSGENDER CARE | Facility: CLINIC | Age: 29
End: 2024-07-17
Payer: MEDICARE

## 2024-07-17 VITALS
HEART RATE: 67 BPM | TEMPERATURE: 97.9 F | SYSTOLIC BLOOD PRESSURE: 116 MMHG | HEIGHT: 68 IN | WEIGHT: 130 LBS | DIASTOLIC BLOOD PRESSURE: 68 MMHG | OXYGEN SATURATION: 100 % | RESPIRATION RATE: 17 BRPM | BODY MASS INDEX: 19.7 KG/M2

## 2024-07-17 DIAGNOSIS — F31.81 BIPOLAR II DISORDER: ICD-10-CM

## 2024-07-17 DIAGNOSIS — F64.9 GENDER IDENTITY DISORDER, UNSPECIFIED: ICD-10-CM

## 2024-07-17 DIAGNOSIS — Z01.818 ENCOUNTER FOR OTHER PREPROCEDURAL EXAMINATION: ICD-10-CM

## 2024-07-17 PROCEDURE — G2211 COMPLEX E/M VISIT ADD ON: CPT

## 2024-07-17 PROCEDURE — 36415 COLL VENOUS BLD VENIPUNCTURE: CPT

## 2024-07-17 PROCEDURE — 99214 OFFICE O/P EST MOD 30 MIN: CPT

## 2024-07-18 ENCOUNTER — TRANSCRIPTION ENCOUNTER (OUTPATIENT)
Age: 29
End: 2024-07-18

## 2024-07-18 LAB
ALBUMIN SERPL ELPH-MCNC: 4.8 G/DL
ALP BLD-CCNC: 52 U/L
ALT SERPL-CCNC: 14 U/L
ANION GAP SERPL CALC-SCNC: 13 MMOL/L
AST SERPL-CCNC: 17 U/L
BASOPHILS # BLD AUTO: 0.07 K/UL
BASOPHILS NFR BLD AUTO: 1 %
BILIRUB SERPL-MCNC: 0.5 MG/DL
BUN SERPL-MCNC: 16 MG/DL
CALCIUM SERPL-MCNC: 9.7 MG/DL
CHLORIDE SERPL-SCNC: 102 MMOL/L
CO2 SERPL-SCNC: 20 MMOL/L
CREAT SERPL-MCNC: 0.78 MG/DL
EGFR: 107 ML/MIN/1.73M2
EOSINOPHIL # BLD AUTO: 0.28 K/UL
EOSINOPHIL NFR BLD AUTO: 4.1 %
ESTRADIOL SERPL-MCNC: 170 PG/ML
GLUCOSE SERPL-MCNC: 82 MG/DL
HCG SERPL-MCNC: <1 MIU/ML
HCT VFR BLD CALC: 42.9 %
HGB BLD-MCNC: 13.7 G/DL
IMM GRANULOCYTES NFR BLD AUTO: 0.1 %
LYMPHOCYTES # BLD AUTO: 1.51 K/UL
LYMPHOCYTES NFR BLD AUTO: 22.3 %
MAN DIFF?: NORMAL
MCHC RBC-ENTMCNC: 29.7 PG
MCHC RBC-ENTMCNC: 31.9 GM/DL
MCV RBC AUTO: 92.9 FL
MONOCYTES # BLD AUTO: 0.41 K/UL
MONOCYTES NFR BLD AUTO: 6 %
NEUTROPHILS # BLD AUTO: 4.5 K/UL
NEUTROPHILS NFR BLD AUTO: 66.5 %
PLATELET # BLD AUTO: 144 K/UL
POTASSIUM SERPL-SCNC: 4.8 MMOL/L
PROT SERPL-MCNC: 7.6 G/DL
RBC # BLD: 4.62 M/UL
RBC # FLD: 12.5 %
SODIUM SERPL-SCNC: 135 MMOL/L
TESTOST SERPL-MCNC: 15.2 NG/DL
WBC # FLD AUTO: 6.78 K/UL

## 2024-07-25 ENCOUNTER — APPOINTMENT (OUTPATIENT)
Dept: TRANSGENDER CARE | Facility: CLINIC | Age: 29
End: 2024-07-25
Payer: MEDICARE

## 2024-07-25 PROCEDURE — 36415 COLL VENOUS BLD VENIPUNCTURE: CPT

## 2024-07-29 ENCOUNTER — NON-APPOINTMENT (OUTPATIENT)
Age: 29
End: 2024-07-29

## 2024-07-29 RX ORDER — OXYCODONE 5 MG/1
5 TABLET ORAL EVERY 6 HOURS
Qty: 19 | Refills: 0 | Status: ACTIVE | COMMUNITY
Start: 2024-07-29 | End: 1900-01-01

## 2024-08-02 LAB
BASOPHILS # BLD AUTO: 0.06 K/UL
BASOPHILS NFR BLD AUTO: 0.7 %
EOSINOPHIL # BLD AUTO: 0.36 K/UL
EOSINOPHIL NFR BLD AUTO: 4.2 %
HCT VFR BLD CALC: 39.4 %
HGB BLD-MCNC: 13.3 G/DL
IMM GRANULOCYTES NFR BLD AUTO: 0.1 %
LYMPHOCYTES # BLD AUTO: 2.11 K/UL
LYMPHOCYTES NFR BLD AUTO: 24.8 %
MAN DIFF?: NORMAL
MCHC RBC-ENTMCNC: 30.3 PG
MCHC RBC-ENTMCNC: 33.8 GM/DL
MCV RBC AUTO: 89.7 FL
MONOCYTES # BLD AUTO: 0.53 K/UL
MONOCYTES NFR BLD AUTO: 6.2 %
NEUTROPHILS # BLD AUTO: 5.45 K/UL
NEUTROPHILS NFR BLD AUTO: 64 %
PLATELET # BLD AUTO: 278 K/UL
RBC # BLD: 4.39 M/UL
RBC # FLD: 12.4 %
WBC # FLD AUTO: 8.52 K/UL

## 2024-08-05 ENCOUNTER — TRANSCRIPTION ENCOUNTER (OUTPATIENT)
Age: 29
End: 2024-08-05

## 2024-08-06 ENCOUNTER — TRANSCRIPTION ENCOUNTER (OUTPATIENT)
Age: 29
End: 2024-08-06

## 2024-08-06 ENCOUNTER — APPOINTMENT (OUTPATIENT)
Dept: PLASTIC SURGERY | Facility: AMBULATORY SURGERY CENTER | Age: 29
End: 2024-08-06

## 2024-08-06 ENCOUNTER — RESULT REVIEW (OUTPATIENT)
Age: 29
End: 2024-08-06

## 2024-08-16 ENCOUNTER — APPOINTMENT (OUTPATIENT)
Dept: PLASTIC SURGERY | Facility: CLINIC | Age: 29
End: 2024-08-16
Payer: MEDICARE

## 2024-08-16 VITALS
OXYGEN SATURATION: 100 % | HEART RATE: 74 BPM | DIASTOLIC BLOOD PRESSURE: 83 MMHG | SYSTOLIC BLOOD PRESSURE: 133 MMHG | WEIGHT: 125 LBS | HEIGHT: 68 IN | RESPIRATION RATE: 16 BRPM | BODY MASS INDEX: 18.94 KG/M2

## 2024-08-16 PROCEDURE — 99024 POSTOP FOLLOW-UP VISIT: CPT

## 2024-08-16 NOTE — ASSESSMENT
[FreeTextEntry1] : Healing well following orchiectomy. Wound care and activity restrictions were reviewed. Follow-up in 2 weeks for reassessment.

## 2024-08-16 NOTE — PHYSICAL EXAM
[de-identified] : Genital exam was performed with a medical assistant chaperone present (SH). Incision intact. A portion of dermabond is adherent. No significant fullness, fluctuance, erythema, or ecchymosis. Nontender inguinal canals.

## 2024-08-16 NOTE — HISTORY OF PRESENT ILLNESS
[FreeTextEntry1] : Patient returns 10 days following orchiectomy. Denies any significant issues. Briefly had some numbness in the left leg after a fall, but this has improved,

## 2024-08-16 NOTE — PHYSICAL EXAM
[de-identified] : Genital exam was performed with a medical assistant chaperone present (SH). Incision intact. A portion of dermabond is adherent. No significant fullness, fluctuance, erythema, or ecchymosis. Nontender inguinal canals.

## 2024-08-30 ENCOUNTER — APPOINTMENT (OUTPATIENT)
Dept: PLASTIC SURGERY | Facility: CLINIC | Age: 29
End: 2024-08-30
Payer: MEDICARE

## 2024-08-30 VITALS
OXYGEN SATURATION: 99 % | WEIGHT: 130 LBS | HEART RATE: 65 BPM | RESPIRATION RATE: 16 BRPM | SYSTOLIC BLOOD PRESSURE: 125 MMHG | DIASTOLIC BLOOD PRESSURE: 77 MMHG | BODY MASS INDEX: 19.7 KG/M2 | HEIGHT: 68 IN

## 2024-08-30 DIAGNOSIS — F64.0 TRANSSEXUALISM: ICD-10-CM

## 2024-08-30 PROCEDURE — 99024 POSTOP FOLLOW-UP VISIT: CPT

## 2024-08-30 NOTE — ASSESSMENT
[FreeTextEntry1] : Healing well following orchiectomy. No evidence of infection. Scar care reviewed. Follow-up in 3 weeks for reassessment.

## 2024-08-30 NOTE — HISTORY OF PRESENT ILLNESS
[FreeTextEntry1] : Patient returns 3 weeks following orchiectomy. Denies any significant issues. Occasional tingling in medial left foot after fall a few days after surgery  Pathology results reviewed with the patient, benign.

## 2024-08-30 NOTE — PHYSICAL EXAM
[de-identified] : Genital exam was performed with a medical assistant chaperone present (DIMPLE). Incision healing appropriately. Nontender inguinal canals. No significant fullness, fluctuance, erythema, or ecchymosis.

## 2024-08-30 NOTE — PHYSICAL EXAM
[de-identified] : Genital exam was performed with a medical assistant chaperone present (DIMPLE). Incision healing appropriately. Nontender inguinal canals. No significant fullness, fluctuance, erythema, or ecchymosis. None

## 2024-09-13 ENCOUNTER — APPOINTMENT (OUTPATIENT)
Dept: TRANSGENDER CARE | Facility: CLINIC | Age: 29
End: 2024-09-13
Payer: MEDICARE

## 2024-09-13 ENCOUNTER — LABORATORY RESULT (OUTPATIENT)
Age: 29
End: 2024-09-13

## 2024-09-13 VITALS
OXYGEN SATURATION: 100 % | HEART RATE: 64 BPM | WEIGHT: 134 LBS | SYSTOLIC BLOOD PRESSURE: 124 MMHG | DIASTOLIC BLOOD PRESSURE: 86 MMHG | HEIGHT: 68 IN | TEMPERATURE: 98.3 F | BODY MASS INDEX: 20.31 KG/M2

## 2024-09-13 DIAGNOSIS — Z23 ENCOUNTER FOR IMMUNIZATION: ICD-10-CM

## 2024-09-13 PROCEDURE — G0008: CPT

## 2024-09-13 PROCEDURE — 90686 IIV4 VACC NO PRSV 0.5 ML IM: CPT

## 2024-09-13 PROCEDURE — 36415 COLL VENOUS BLD VENIPUNCTURE: CPT

## 2024-09-13 PROCEDURE — 99214 OFFICE O/P EST MOD 30 MIN: CPT

## 2024-09-13 RX ORDER — LITHIUM CARBONATE 300 MG/1
300 TABLET, FILM COATED, EXTENDED RELEASE ORAL
Refills: 0 | Status: ACTIVE | COMMUNITY
Start: 2024-09-13

## 2024-09-13 NOTE — HISTORY OF PRESENT ILLNESS
[FreeTextEntry1] : f/u [de-identified] : Patient here today for f/u  Gender Dysphoria: started gaht dec 2020. Patient had bilateral orchi 8/6/24, has final follow up on 9/20/24. She has been feeling on estradiol only, off celine. She reports morgan right after surgery but has leveled off. She has gained weight since then. No supplements like biotin etc.   Last injection: saturday afternoon 9/14/24  MH: Patient following with psychiatrist and therapist. Denies thoughts of hurting self or anyone else. She reports that lithium was increased, needs blood for psychiatrist aware any abnormalities will need to be addressed by psychiatrist  SH: denies any sexual partners but would like updated sti screenings   Patient denies any headache visual changes, dizziness, chest pain, SOB, palpitations, wheezing, cough, abdominal pain, nausea, vomiting, diarrhea, joint pain, leg swelling or leg pain. No other health concerns today.

## 2024-09-13 NOTE — ASSESSMENT
[FreeTextEntry1] :  Blood work was collected in office at time of visit.   The [employer] Safe Sex Evidence Based Intervention was applied and a [ positive reinforcement of safe behavior message ] was provided.

## 2024-09-13 NOTE — PLAN
[FreeTextEntry1] : Gender Dysphoria: stable. Will order routine blood work and f/u results to patient once made available - will send [facility] message with results and dose adjustment if needed. F/u 3 months. Advised patient to call with any questions or concerns. Patient verbalized understanding.   Bipolar disorder: stable, patient denies any thoughts of hurting self or anyone else at time of visit. Will order routine blood work and f/u results to patient once made available. Continue care under therapist/psych. Advised patient to call with any questions or concerns. Patient verbalized understanding.   STI Screen - Counseled on safe sex practices, risk reduction and condom use. Condoms offered to patient. - Aware of indications for both PrEP and nPEP. nPEP must be taken with in the first 72 hours. Must go to nearest ER. - Labs done today for HIV, Hepatitis, Syphilis, GC/ CT. Verbal consent obtained for HIV testing. - Advised patient to call with any questions or concerns. Patient verbalized understanding.   Patient given flu shot at time of visit - counseled. Advised patient to call with any questions or concerns. Patient verbalized understanding.

## 2024-09-14 LAB
25(OH)D3 SERPL-MCNC: 12.8 NG/ML
ALBUMIN SERPL ELPH-MCNC: 4.8 G/DL
ALP BLD-CCNC: 56 U/L
ALT SERPL-CCNC: 20 U/L
ANION GAP SERPL CALC-SCNC: 10 MMOL/L
APPEARANCE: CLEAR
AST SERPL-CCNC: 20 U/L
BACTERIA: NEGATIVE /HPF
BASOPHILS # BLD AUTO: 0.03 K/UL
BASOPHILS NFR BLD AUTO: 0.5 %
BILIRUB SERPL-MCNC: 0.6 MG/DL
BILIRUBIN URINE: NEGATIVE
BLOOD URINE: NEGATIVE
BUN SERPL-MCNC: 11 MG/DL
CALCIUM SERPL-MCNC: 9.5 MG/DL
CAST: 0 /LPF
CHLORIDE SERPL-SCNC: 103 MMOL/L
CHOLEST SERPL-MCNC: 225 MG/DL
CO2 SERPL-SCNC: 26 MMOL/L
COLOR: YELLOW
CREAT SERPL-MCNC: 0.77 MG/DL
EGFR: 125 ML/MIN/1.73M2
EOSINOPHIL # BLD AUTO: 0.1 K/UL
EOSINOPHIL NFR BLD AUTO: 1.5 %
EPITHELIAL CELLS: 8 /HPF
ESTIMATED AVERAGE GLUCOSE: 88 MG/DL
ESTRADIOL SERPL-MCNC: 113 PG/ML
GLUCOSE QUALITATIVE U: NEGATIVE MG/DL
GLUCOSE SERPL-MCNC: 87 MG/DL
HBA1C MFR BLD HPLC: 4.7 %
HBV CORE IGG+IGM SER QL: NONREACTIVE
HBV SURFACE AB SERPL IA-ACNC: 42.4 MIU/ML
HBV SURFACE AG SER QL: NONREACTIVE
HCT VFR BLD CALC: 41 %
HCV AB SER QL: NONREACTIVE
HCV S/CO RATIO: 0.48 S/CO
HDLC SERPL-MCNC: 106 MG/DL
HEPATITIS A IGG ANTIBODY: REACTIVE
HGB BLD-MCNC: 13.2 G/DL
HIV1 RNA # SERPL NAA+PROBE: NORMAL
HIV1 RNA # SERPL NAA+PROBE: NORMAL COPIES/ML
HIV1+2 AB SPEC QL IA.RAPID: NONREACTIVE
IMM GRANULOCYTES NFR BLD AUTO: 0.5 %
KETONES URINE: NEGATIVE MG/DL
LDLC SERPL CALC-MCNC: 108 MG/DL
LEUKOCYTE ESTERASE URINE: ABNORMAL
LITHIUM SERPL-SCNC: 0.51 MMOL/L
LYMPHOCYTES # BLD AUTO: 1.48 K/UL
LYMPHOCYTES NFR BLD AUTO: 22.3 %
MAN DIFF?: NORMAL
MCHC RBC-ENTMCNC: 30.4 PG
MCHC RBC-ENTMCNC: 32.2 GM/DL
MCV RBC AUTO: 94.5 FL
MICROSCOPIC-UA: NORMAL
MONOCYTES # BLD AUTO: 0.35 K/UL
MONOCYTES NFR BLD AUTO: 5.3 %
NEUTROPHILS # BLD AUTO: 4.64 K/UL
NEUTROPHILS NFR BLD AUTO: 69.9 %
NITRITE URINE: NEGATIVE
NONHDLC SERPL-MCNC: 120 MG/DL
PH URINE: 8
PLATELET # BLD AUTO: 295 K/UL
POTASSIUM SERPL-SCNC: 4.1 MMOL/L
PROT SERPL-MCNC: 7.3 G/DL
PROTEIN URINE: NEGATIVE MG/DL
RBC # BLD: 4.34 M/UL
RBC # FLD: 13 %
RED BLOOD CELLS URINE: 0 /HPF
SODIUM SERPL-SCNC: 139 MMOL/L
SPECIFIC GRAVITY URINE: 1.01
T PALLIDUM AB SER QL IA: NEGATIVE
T4 FREE SERPL-MCNC: 1.2 NG/DL
TESTOST SERPL-MCNC: 8.8 NG/DL
TRIGL SERPL-MCNC: 70 MG/DL
TSH SERPL-ACNC: 3.26 UIU/ML
UROBILINOGEN URINE: 0.2 MG/DL
VIRAL LOAD INTERP: NORMAL
VIRAL LOAD LOG: NORMAL LG COP/ML
WBC # FLD AUTO: 6.63 K/UL
WHITE BLOOD CELLS URINE: 4 /HPF

## 2024-09-17 ENCOUNTER — TRANSCRIPTION ENCOUNTER (OUTPATIENT)
Age: 29
End: 2024-09-17

## 2024-09-17 LAB
C TRACH RRNA SPEC QL NAA+PROBE: NOT DETECTED
C TRACH RRNA SPEC QL NAA+PROBE: NOT DETECTED
N GONORRHOEA RRNA SPEC QL NAA+PROBE: NOT DETECTED
N GONORRHOEA RRNA SPEC QL NAA+PROBE: NOT DETECTED
SOURCE AMPLIFICATION: NORMAL
SOURCE ORAL: NORMAL

## 2024-09-20 ENCOUNTER — APPOINTMENT (OUTPATIENT)
Dept: PLASTIC SURGERY | Facility: CLINIC | Age: 29
End: 2024-09-20

## 2024-09-20 VITALS
WEIGHT: 132 LBS | RESPIRATION RATE: 16 BRPM | HEART RATE: 69 BPM | HEIGHT: 68 IN | BODY MASS INDEX: 20 KG/M2 | DIASTOLIC BLOOD PRESSURE: 76 MMHG | SYSTOLIC BLOOD PRESSURE: 111 MMHG | OXYGEN SATURATION: 98 %

## 2024-09-20 DIAGNOSIS — F64.0 TRANSSEXUALISM: ICD-10-CM

## 2024-09-20 PROCEDURE — 99024 POSTOP FOLLOW-UP VISIT: CPT

## 2024-09-27 NOTE — ASSESSMENT
[FreeTextEntry1] : Healing well following orchiectomy. No evidence of infection. Scar care reviewed. Small mass of scrotum noted, likely related to closure. Monitor; recommend follow-up at 3 month elizabet.

## 2024-09-27 NOTE — PHYSICAL EXAM
[de-identified] : Genitourinary: Genital exam was performed with a medical assistant chaperone present (EC). Incision healing appropriately. Minimal tenderness at left inguinal canal. No significant fullness, fluctuance, or erythema. 8mm x 4mm palpable smooth semi-mobile subcutaneous mass in right hemiscrotum semi-adherent to urethrna. mild tenderness.

## 2024-09-27 NOTE — HISTORY OF PRESENT ILLNESS
[FreeTextEntry1] : Patient returns 6 weeks following orchiectomy. Denies any significant issues. She is feeling well overall, still has aching pain in left groin after a fall. Notes a lump in right hemiscrotum.

## 2024-09-27 NOTE — PHYSICAL EXAM
[de-identified] : Genitourinary: Genital exam was performed with a medical assistant chaperone present (EC). Incision healing appropriately. Minimal tenderness at left inguinal canal. No significant fullness, fluctuance, or erythema. 8mm x 4mm palpable smooth semi-mobile subcutaneous mass in right hemiscrotum semi-adherent to urethrna. mild tenderness.

## 2025-06-13 NOTE — ASSESSMENT
SURVEY:    You may be receiving a survey from [facility] today.    You may get this in the mail, through your MyChart or in your email.     Please complete the survey to enable us to provide the highest quality of care to you and your family.    If you cannot score us as very good ( 5 Stars) on any question, please feel free to call the office to discuss how we could have made your experience exceptional.     Thank you.    Clinical Care Team:  Dr. Jorge Rodríguez, DO Monica Small LPN    Triage:  Lteicia Kessler CMA    Clerical Team:  Leticia Ramos     [FreeTextEntry1] : Healing well following orchiectomy. Wound care and activity restrictions were reviewed. Follow-up in 2 weeks for reassessment.